# Patient Record
Sex: FEMALE | Race: WHITE | NOT HISPANIC OR LATINO
[De-identification: names, ages, dates, MRNs, and addresses within clinical notes are randomized per-mention and may not be internally consistent; named-entity substitution may affect disease eponyms.]

---

## 2017-11-01 ENCOUNTER — HOSPITAL (OUTPATIENT)
Dept: OTHER | Age: 62
End: 2017-11-01
Attending: EMERGENCY MEDICINE

## 2017-11-01 LAB
CONTROL LINE: PRESENT
Lab: CLEAR
STREP POC: NEGATIVE

## 2018-01-13 ENCOUNTER — HOSPITAL (OUTPATIENT)
Dept: OTHER | Age: 63
End: 2018-01-13
Attending: EMERGENCY MEDICINE

## 2018-08-21 ENCOUNTER — HOSPITAL (OUTPATIENT)
Dept: OTHER | Age: 63
End: 2018-08-21
Attending: FAMILY MEDICINE

## 2018-09-02 ENCOUNTER — HOSPITAL (OUTPATIENT)
Dept: OTHER | Age: 63
End: 2018-09-02
Attending: PHYSICIAN ASSISTANT

## 2018-09-04 ENCOUNTER — HOSPITAL (OUTPATIENT)
Dept: OTHER | Age: 63
End: 2018-09-04
Attending: NURSE PRACTITIONER

## 2019-02-07 ENCOUNTER — HOSPITAL (OUTPATIENT)
Dept: OTHER | Age: 64
End: 2019-02-07
Attending: FAMILY MEDICINE

## 2019-07-19 ENCOUNTER — HOSPITAL (OUTPATIENT)
Dept: OTHER | Age: 64
End: 2019-07-19
Attending: FAMILY MEDICINE

## 2019-07-20 ENCOUNTER — HOSPITAL (OUTPATIENT)
Dept: OTHER | Age: 64
End: 2019-07-20
Attending: FAMILY MEDICINE

## 2019-11-21 ENCOUNTER — HOSPITAL (OUTPATIENT)
Dept: OTHER | Age: 64
End: 2019-11-21
Attending: EMERGENCY MEDICINE

## 2024-02-29 ENCOUNTER — TELEMEDICINE (OUTPATIENT)
Dept: PRIMARY CARE | Facility: CLINIC | Age: 69
End: 2024-02-29
Payer: MEDICARE

## 2024-02-29 VITALS — HEIGHT: 65 IN | BODY MASS INDEX: 23.99 KG/M2 | WEIGHT: 144 LBS

## 2024-02-29 DIAGNOSIS — J40 BRONCHITIS: Primary | ICD-10-CM

## 2024-02-29 PROCEDURE — 1036F TOBACCO NON-USER: CPT | Performed by: PHYSICIAN ASSISTANT

## 2024-02-29 PROCEDURE — 99442 PR PHYS/QHP TELEPHONE EVALUATION 11-20 MIN: CPT | Performed by: PHYSICIAN ASSISTANT

## 2024-02-29 PROCEDURE — 1126F AMNT PAIN NOTED NONE PRSNT: CPT | Performed by: PHYSICIAN ASSISTANT

## 2024-02-29 RX ORDER — LEVOTHYROXINE SODIUM 25 UG/1
25 TABLET ORAL EVERY 24 HOURS
COMMUNITY
Start: 2023-04-04 | End: 2024-03-28

## 2024-02-29 RX ORDER — BENZONATATE 100 MG/1
100 CAPSULE ORAL 3 TIMES DAILY PRN
Qty: 30 CAPSULE | Refills: 0 | Status: SHIPPED | OUTPATIENT
Start: 2024-02-29 | End: 2024-03-30

## 2024-02-29 RX ORDER — DOXYCYCLINE 100 MG/1
100 CAPSULE ORAL 2 TIMES DAILY
Qty: 20 CAPSULE | Refills: 0 | Status: SHIPPED | OUTPATIENT
Start: 2024-02-29 | End: 2024-03-10

## 2024-02-29 ASSESSMENT — ENCOUNTER SYMPTOMS: COUGH: 1

## 2024-02-29 ASSESSMENT — PAIN SCALES - GENERAL: PAINLEVEL: 0-NO PAIN

## 2024-02-29 NOTE — PROGRESS NOTES
"Subjective   Patient ID: Marie Trammell is a 68 y.o. female who presents for Cough (Patient states she has had nasal, chest congestion [id number]. Just started using Mucinex x1 week and now she is coughing up yellow/green mucus//bp).    Presents for c/o cough, congestion onset 3 weeks but lingering - occ sweats after coughing. Cough is productive. Denies fever.     Cough         Review of Systems   Respiratory:  Positive for cough.        Objective   Ht 1.651 m (5' 5\")   Wt 65.3 kg (144 lb)   BMI 23.96 kg/m²     Physical Exam    Assessment/Plan   Diagnoses and all orders for this visit:  Bronchitis  -     doxycycline (Vibramycin) 100 mg capsule; Take 1 capsule (100 mg) by mouth 2 times a day for 10 days. Take with at least 8 ounces (large glass) of water, do not lie down for 30 minutes after  -     benzonatate (Tessalon) 100 mg capsule; Take 1 capsule (100 mg) by mouth 3 times a day as needed for cough. Do not crush or chew.         "

## 2024-03-28 DIAGNOSIS — E03.9 ACQUIRED HYPOTHYROIDISM: ICD-10-CM

## 2024-03-28 DIAGNOSIS — E83.39 ACQUIRED HYPOPHOSPHATEMIA: Primary | ICD-10-CM

## 2024-03-28 RX ORDER — LEVOTHYROXINE SODIUM 25 UG/1
25 TABLET ORAL
Qty: 90 TABLET | Refills: 1 | Status: SHIPPED | OUTPATIENT
Start: 2024-03-28

## 2024-04-23 ENCOUNTER — TELEPHONE (OUTPATIENT)
Dept: PRIMARY CARE | Facility: CLINIC | Age: 69
End: 2024-04-23

## 2024-04-23 DIAGNOSIS — Z11.59 ENCOUNTER FOR HEPATITIS C SCREENING TEST FOR LOW RISK PATIENT: ICD-10-CM

## 2024-04-23 DIAGNOSIS — D56.3 THALASSEMIA MINOR: ICD-10-CM

## 2024-04-23 DIAGNOSIS — Z00.00 ROUTINE MEDICAL EXAM: ICD-10-CM

## 2024-04-23 DIAGNOSIS — Z13.1 SCREENING FOR DIABETES MELLITUS: ICD-10-CM

## 2024-04-23 DIAGNOSIS — Z12.31 BREAST CANCER SCREENING BY MAMMOGRAM: ICD-10-CM

## 2024-04-23 DIAGNOSIS — N95.9 MENOPAUSAL DISORDER: ICD-10-CM

## 2024-04-23 DIAGNOSIS — E03.9 ACQUIRED HYPOTHYROIDISM: ICD-10-CM

## 2024-04-23 DIAGNOSIS — E55.9 VITAMIN D DEFICIENCY: Primary | ICD-10-CM

## 2024-04-23 DIAGNOSIS — Z13.220 LIPID SCREENING: ICD-10-CM

## 2024-04-23 NOTE — TELEPHONE ENCOUNTER
Patient is requesting that lab orders be put in prior to her annual wellness so the results can be discussed when she comes in on 5/24

## 2024-04-30 ENCOUNTER — HOSPITAL ENCOUNTER (OUTPATIENT)
Dept: RADIOLOGY | Facility: HOSPITAL | Age: 69
Discharge: HOME | End: 2024-04-30
Payer: MEDICARE

## 2024-04-30 VITALS — HEIGHT: 65 IN | WEIGHT: 150 LBS | BODY MASS INDEX: 24.99 KG/M2

## 2024-04-30 DIAGNOSIS — Z12.31 BREAST CANCER SCREENING BY MAMMOGRAM: ICD-10-CM

## 2024-04-30 DIAGNOSIS — N95.9 MENOPAUSAL DISORDER: ICD-10-CM

## 2024-04-30 PROCEDURE — 77067 SCR MAMMO BI INCL CAD: CPT | Performed by: RADIOLOGY

## 2024-04-30 PROCEDURE — 77063 BREAST TOMOSYNTHESIS BI: CPT | Performed by: RADIOLOGY

## 2024-04-30 PROCEDURE — 77080 DXA BONE DENSITY AXIAL: CPT

## 2024-04-30 PROCEDURE — 77080 DXA BONE DENSITY AXIAL: CPT | Performed by: RADIOLOGY

## 2024-04-30 PROCEDURE — 77067 SCR MAMMO BI INCL CAD: CPT

## 2024-05-20 ENCOUNTER — LAB (OUTPATIENT)
Dept: LAB | Facility: LAB | Age: 69
End: 2024-05-20
Payer: MEDICARE

## 2024-05-20 DIAGNOSIS — Z11.59 ENCOUNTER FOR HEPATITIS C SCREENING TEST FOR LOW RISK PATIENT: ICD-10-CM

## 2024-05-20 DIAGNOSIS — Z00.00 ROUTINE MEDICAL EXAM: ICD-10-CM

## 2024-05-20 DIAGNOSIS — Z13.1 SCREENING FOR DIABETES MELLITUS: ICD-10-CM

## 2024-05-20 DIAGNOSIS — Z13.220 LIPID SCREENING: ICD-10-CM

## 2024-05-20 DIAGNOSIS — E55.9 VITAMIN D DEFICIENCY: ICD-10-CM

## 2024-05-20 DIAGNOSIS — E03.9 ACQUIRED HYPOTHYROIDISM: ICD-10-CM

## 2024-05-20 DIAGNOSIS — Z13.0 SCREENING, IRON DEFICIENCY ANEMIA: ICD-10-CM

## 2024-05-20 DIAGNOSIS — D56.3 THALASSEMIA MINOR: ICD-10-CM

## 2024-05-20 LAB
25(OH)D3 SERPL-MCNC: 17 NG/ML (ref 30–100)
ALBUMIN SERPL BCP-MCNC: 4.1 G/DL (ref 3.4–5)
ALP SERPL-CCNC: 77 U/L (ref 33–136)
ALT SERPL W P-5'-P-CCNC: 10 U/L (ref 7–45)
ANION GAP SERPL CALC-SCNC: 9 MMOL/L (ref 10–20)
APPEARANCE UR: ABNORMAL
AST SERPL W P-5'-P-CCNC: 9 U/L (ref 9–39)
BACTERIA #/AREA URNS AUTO: ABNORMAL /HPF
BASOPHILS # BLD AUTO: 0.1 X10*3/UL (ref 0–0.1)
BASOPHILS NFR BLD AUTO: 0.9 %
BILIRUB SERPL-MCNC: 0.9 MG/DL (ref 0–1.2)
BILIRUB UR STRIP.AUTO-MCNC: NEGATIVE MG/DL
BUN SERPL-MCNC: 11 MG/DL (ref 6–23)
BURR CELLS BLD QL SMEAR: NORMAL
CALCIUM SERPL-MCNC: 9.3 MG/DL (ref 8.6–10.6)
CHLORIDE SERPL-SCNC: 106 MMOL/L (ref 98–107)
CHOLEST SERPL-MCNC: 175 MG/DL (ref 0–199)
CHOLESTEROL/HDL RATIO: 4.6
CO2 SERPL-SCNC: 26 MMOL/L (ref 21–32)
COLOR UR: YELLOW
CREAT SERPL-MCNC: 0.51 MG/DL (ref 0.5–1.05)
EGFRCR SERPLBLD CKD-EPI 2021: >90 ML/MIN/1.73M*2
EOSINOPHIL # BLD AUTO: 0.33 X10*3/UL (ref 0–0.7)
EOSINOPHIL NFR BLD AUTO: 2.9 %
ERYTHROCYTE [DISTWIDTH] IN BLOOD BY AUTOMATED COUNT: 22.4 % (ref 11.5–14.5)
GLUCOSE SERPL-MCNC: 81 MG/DL (ref 74–99)
GLUCOSE UR STRIP.AUTO-MCNC: NORMAL MG/DL
HCT VFR BLD AUTO: 35.5 % (ref 36–46)
HCV AB SER QL: NONREACTIVE
HDLC SERPL-MCNC: 38.2 MG/DL
HGB BLD-MCNC: 11.3 G/DL (ref 12–16)
IMM GRANULOCYTES # BLD AUTO: 0.1 X10*3/UL (ref 0–0.7)
IMM GRANULOCYTES NFR BLD AUTO: 0.9 % (ref 0–0.9)
KETONES UR STRIP.AUTO-MCNC: NEGATIVE MG/DL
LDLC SERPL CALC-MCNC: 107 MG/DL
LEUKOCYTE ESTERASE UR QL STRIP.AUTO: ABNORMAL
LYMPHOCYTES # BLD AUTO: 3.38 X10*3/UL (ref 1.2–4.8)
LYMPHOCYTES NFR BLD AUTO: 29.9 %
MCH RBC QN AUTO: 22.6 PG (ref 26–34)
MCHC RBC AUTO-ENTMCNC: 31.8 G/DL (ref 32–36)
MCV RBC AUTO: 71 FL (ref 80–100)
MONOCYTES # BLD AUTO: 0.86 X10*3/UL (ref 0.1–1)
MONOCYTES NFR BLD AUTO: 7.6 %
MUCOUS THREADS #/AREA URNS AUTO: ABNORMAL /LPF
NEUTROPHILS # BLD AUTO: 6.52 X10*3/UL (ref 1.2–7.7)
NEUTROPHILS NFR BLD AUTO: 57.8 %
NITRITE UR QL STRIP.AUTO: NEGATIVE
NON HDL CHOLESTEROL: 137 MG/DL (ref 0–149)
NRBC BLD-RTO: 0.3 /100 WBCS (ref 0–0)
OVALOCYTES BLD QL SMEAR: NORMAL
PH UR STRIP.AUTO: 5.5 [PH]
PLATELET # BLD AUTO: 365 X10*3/UL (ref 150–450)
POTASSIUM SERPL-SCNC: 4.3 MMOL/L (ref 3.5–5.3)
PROT SERPL-MCNC: 6.5 G/DL (ref 6.4–8.2)
PROT UR STRIP.AUTO-MCNC: ABNORMAL MG/DL
RBC # BLD AUTO: 5.01 X10*6/UL (ref 4–5.2)
RBC # UR STRIP.AUTO: NEGATIVE /UL
RBC #/AREA URNS AUTO: ABNORMAL /HPF
RBC MORPH BLD: NORMAL
SODIUM SERPL-SCNC: 137 MMOL/L (ref 136–145)
SP GR UR STRIP.AUTO: 1.02
SQUAMOUS #/AREA URNS AUTO: ABNORMAL /HPF
TRIGL SERPL-MCNC: 150 MG/DL (ref 0–149)
TSH SERPL-ACNC: 1.01 MIU/L (ref 0.44–3.98)
UROBILINOGEN UR STRIP.AUTO-MCNC: NORMAL MG/DL
VLDL: 30 MG/DL (ref 0–40)
WBC # BLD AUTO: 11.3 X10*3/UL (ref 4.4–11.3)
WBC #/AREA URNS AUTO: ABNORMAL /HPF

## 2024-05-20 PROCEDURE — 83550 IRON BINDING TEST: CPT

## 2024-05-20 PROCEDURE — 83540 ASSAY OF IRON: CPT

## 2024-05-20 PROCEDURE — 86803 HEPATITIS C AB TEST: CPT

## 2024-05-20 PROCEDURE — 80053 COMPREHEN METABOLIC PANEL: CPT

## 2024-05-20 PROCEDURE — 36415 COLL VENOUS BLD VENIPUNCTURE: CPT

## 2024-05-20 PROCEDURE — 85025 COMPLETE CBC W/AUTO DIFF WBC: CPT

## 2024-05-20 PROCEDURE — 84443 ASSAY THYROID STIM HORMONE: CPT

## 2024-05-20 PROCEDURE — 81001 URINALYSIS AUTO W/SCOPE: CPT

## 2024-05-20 PROCEDURE — 82728 ASSAY OF FERRITIN: CPT

## 2024-05-20 PROCEDURE — 82306 VITAMIN D 25 HYDROXY: CPT

## 2024-05-20 PROCEDURE — 80061 LIPID PANEL: CPT

## 2024-05-24 ENCOUNTER — OFFICE VISIT (OUTPATIENT)
Dept: PRIMARY CARE | Facility: CLINIC | Age: 69
End: 2024-05-24
Payer: MEDICARE

## 2024-05-24 VITALS
WEIGHT: 157 LBS | BODY MASS INDEX: 26.13 KG/M2 | DIASTOLIC BLOOD PRESSURE: 79 MMHG | HEART RATE: 56 BPM | SYSTOLIC BLOOD PRESSURE: 123 MMHG | OXYGEN SATURATION: 96 %

## 2024-05-24 DIAGNOSIS — J30.2 SEASONAL ALLERGIES: ICD-10-CM

## 2024-05-24 DIAGNOSIS — G47.9 SLEEP DISORDER: ICD-10-CM

## 2024-05-24 DIAGNOSIS — E53.9 VITAMIN B DEFICIENCY: ICD-10-CM

## 2024-05-24 DIAGNOSIS — Z00.00 ROUTINE GENERAL MEDICAL EXAMINATION AT HEALTH CARE FACILITY: Primary | ICD-10-CM

## 2024-05-24 DIAGNOSIS — D56.3 HETEROZYGOUS THALASSEMIA: ICD-10-CM

## 2024-05-24 DIAGNOSIS — Z13.6 SCREENING FOR CARDIOVASCULAR CONDITION: ICD-10-CM

## 2024-05-24 DIAGNOSIS — Z87.891 PERSONAL HISTORY OF TOBACCO USE, PRESENTING HAZARDS TO HEALTH: ICD-10-CM

## 2024-05-24 DIAGNOSIS — R06.2 WHEEZING: ICD-10-CM

## 2024-05-24 PROBLEM — E03.9 HYPOTHYROIDISM: Status: ACTIVE | Noted: 2024-05-24

## 2024-05-24 PROBLEM — N95.1 FEMALE CLIMACTERIC STATE: Status: ACTIVE | Noted: 2024-05-24

## 2024-05-24 PROBLEM — F41.1 GENERALIZED ANXIETY DISORDER: Status: ACTIVE | Noted: 2021-08-12

## 2024-05-24 PROBLEM — K21.9 GASTROESOPHAGEAL REFLUX DISEASE: Status: ACTIVE | Noted: 2024-05-24

## 2024-05-24 PROBLEM — N81.4 UTERINE PROLAPSE: Status: ACTIVE | Noted: 2024-05-24

## 2024-05-24 LAB
FERRITIN SERPL-MCNC: 153 NG/ML (ref 8–150)
IRON SATN MFR SERPL: 37 % (ref 25–45)
IRON SERPL-MCNC: 115 UG/DL (ref 35–150)
TIBC SERPL-MCNC: 310 UG/DL (ref 240–445)
UIBC SERPL-MCNC: 195 UG/DL (ref 110–370)

## 2024-05-24 PROCEDURE — 1159F MED LIST DOCD IN RCRD: CPT | Performed by: PHYSICIAN ASSISTANT

## 2024-05-24 PROCEDURE — G0439 PPPS, SUBSEQ VISIT: HCPCS | Performed by: PHYSICIAN ASSISTANT

## 2024-05-24 PROCEDURE — 1158F ADVNC CARE PLAN TLK DOCD: CPT | Performed by: PHYSICIAN ASSISTANT

## 2024-05-24 PROCEDURE — 99215 OFFICE O/P EST HI 40 MIN: CPT | Performed by: PHYSICIAN ASSISTANT

## 2024-05-24 PROCEDURE — 1126F AMNT PAIN NOTED NONE PRSNT: CPT | Performed by: PHYSICIAN ASSISTANT

## 2024-05-24 PROCEDURE — 1157F ADVNC CARE PLAN IN RCRD: CPT | Performed by: PHYSICIAN ASSISTANT

## 2024-05-24 PROCEDURE — 1170F FXNL STATUS ASSESSED: CPT | Performed by: PHYSICIAN ASSISTANT

## 2024-05-24 PROCEDURE — 1036F TOBACCO NON-USER: CPT | Performed by: PHYSICIAN ASSISTANT

## 2024-05-24 PROCEDURE — 99397 PER PM REEVAL EST PAT 65+ YR: CPT | Performed by: PHYSICIAN ASSISTANT

## 2024-05-24 PROCEDURE — 1123F ACP DISCUSS/DSCN MKR DOCD: CPT | Performed by: PHYSICIAN ASSISTANT

## 2024-05-24 RX ORDER — BUDESONIDE AND FORMOTEROL FUMARATE DIHYDRATE 80; 4.5 UG/1; UG/1
2 AEROSOL RESPIRATORY (INHALATION)
Qty: 10.2 G | Refills: 5 | Status: SHIPPED | OUTPATIENT
Start: 2024-05-24 | End: 2025-05-24

## 2024-05-24 RX ORDER — HYDROXYZINE HYDROCHLORIDE 25 MG/1
25 TABLET, FILM COATED ORAL NIGHTLY
Qty: 30 TABLET | Refills: 1 | Status: SHIPPED | OUTPATIENT
Start: 2024-05-24

## 2024-05-24 RX ORDER — AZELASTINE 1 MG/ML
1 SPRAY, METERED NASAL 2 TIMES DAILY
Qty: 30 ML | Refills: 12 | Status: SHIPPED | OUTPATIENT
Start: 2024-05-24 | End: 2025-05-24

## 2024-05-24 ASSESSMENT — ENCOUNTER SYMPTOMS
WHEEZING: 0
SHORTNESS OF BREATH: 0
COUGH: 1
DEPRESSION: 0
FATIGUE: 1
LOSS OF SENSATION IN FEET: 0
OCCASIONAL FEELINGS OF UNSTEADINESS: 0
FEVER: 0
ABDOMINAL PAIN: 0

## 2024-05-24 ASSESSMENT — GERIATRIC MINI NUTRITIONAL ASSESSMENT (MNA)
B WEIGHT LOSS DURING THE LAST 3 MONTHS: DOES NOT KNOW
E NEUROPSYCHOLOGICAL PROBLEMS: NO PSYCHOLOGICAL PROBLEMS
A HAS FOOD INTAKE DECLINED OVER THE PAST 3 MONTHS DUE TO LOSS OF APPETITE, DIGESTIVE PROBLEMS, CHEWING OR SWALLOWING DIFFICULTIES?: NO DECREASE IN FOOD INTAKE
C GENERAL MOBILITY: GOES OUT

## 2024-05-24 ASSESSMENT — ACTIVITIES OF DAILY LIVING (ADL)
MANAGING FINANCES: INDEPENDENT
JUDGMENT_ADEQUATE_SAFELY_COMPLETE_DAILY_ACTIVITIES: YES
TOILETING: INDEPENDENT
USING TRANSPORTATION: INDEPENDENT
USING TELEPHONE: INDEPENDENT
NEEDS ASSISTANCE WITH FOOD: INDEPENDENT
EATING: INDEPENDENT
PREPARING MEALS: INDEPENDENT
GROCERY SHOPPING: INDEPENDENT
TAKING MEDICATION: INDEPENDENT
DRESSING: INDEPENDENT
FEEDING: INDEPENDENT
ADEQUATE_TO_COMPLETE_ADL: YES
DOING HOUSEWORK: INDEPENDENT
STIL DRIVING: YES
PILL BOX USED: NO

## 2024-05-24 ASSESSMENT — PATIENT HEALTH QUESTIONNAIRE - PHQ9
SUM OF ALL RESPONSES TO PHQ9 QUESTIONS 1 AND 2: 0
2. FEELING DOWN, DEPRESSED OR HOPELESS: NOT AT ALL
1. LITTLE INTEREST OR PLEASURE IN DOING THINGS: NOT AT ALL

## 2024-05-24 ASSESSMENT — PAIN SCALES - GENERAL: PAINLEVEL: 0-NO PAIN

## 2024-05-24 ASSESSMENT — COGNITIVE AND FUNCTIONAL STATUS - GENERAL: VERBAL FLUENCY - ANIMAL NAMES (0 TO 25): 3

## 2024-05-24 NOTE — PROGRESS NOTES
Subjective   Patient ID: Marie Trammell is a 68 y.o. female who presents for Annual Exam.    HPI   Active Medical Problems: vitamin D deficiency, osteopenia, hypothyroidism  Other Providers: none  PMH/PSH/FMH/SHX: Reviewed  Advanced Directives: Discussed  Hearing changes: Denies  Depression: Denies  Functional Ability/Safety: Get up and go test <30s. Denies issues with ADLs. No home safety concerns. Pt denies falls  Cognitive Eval: See MMSE  Preventative Services: DEXA, mammogram UTD    Labs w/unspecified anemia (thalassemia), low vitamin D, elevated cholesterol.     C/o postnasal drip, persistent cough. Feeling more fatigued. Denies wheezing, dyspnea. Quit smoking last year, previously 1/2PPD x >40 years.    Reports difficulty sleeping. Avg 3-5 hrs per night. Under excess stress managing mom's care - she is moving to an assisted living facility next week. Has tried melatonin, tylenol PM w/no success.    Review of Systems   Constitutional:  Positive for fatigue. Negative for fever.   Respiratory:  Positive for cough. Negative for shortness of breath and wheezing.    Cardiovascular:  Negative for chest pain.   Gastrointestinal:  Negative for abdominal pain.   Skin:  Negative for rash.       Objective   /79   Pulse 56   Wt 71.2 kg (157 lb)   SpO2 96%   BMI 26.13 kg/m²     Physical Exam  Vitals reviewed.   Constitutional:       Appearance: Normal appearance.   HENT:      Head: Normocephalic and atraumatic.      Right Ear: Tympanic membrane and ear canal normal.      Left Ear: Tympanic membrane and ear canal normal.      Nose: Nose normal.      Mouth/Throat:      Mouth: Mucous membranes are moist.      Pharynx: No oropharyngeal exudate.   Eyes:      Extraocular Movements: Extraocular movements intact.      Conjunctiva/sclera: Conjunctivae normal.      Pupils: Pupils are equal, round, and reactive to light.   Cardiovascular:      Rate and Rhythm: Normal rate and regular rhythm.      Heart sounds: Normal heart  sounds.   Pulmonary:      Effort: Pulmonary effort is normal.      Breath sounds: Normal breath sounds. No wheezing.   Abdominal:      General: There is no distension.      Palpations: Abdomen is soft.      Tenderness: There is no abdominal tenderness.   Musculoskeletal:         General: No tenderness.      Cervical back: Normal range of motion and neck supple.   Skin:     General: Skin is warm and dry.      Findings: No rash.   Neurological:      General: No focal deficit present.      Mental Status: She is alert. Mental status is at baseline.   Psychiatric:         Mood and Affect: Mood normal.         Assessment/Plan   Problem List Items Addressed This Visit             ICD-10-CM    Heterozygous thalassemia D56.3    Relevant Orders    CBC and Auto Differential     Other Visit Diagnoses         Codes    Routine general medical examination at health care facility    -  Primary Z00.00    Vitamin B deficiency     E53.9    Relevant Orders    Vitamin B12    Screening for cardiovascular condition     Z13.6    Relevant Orders    CT cardiac scoring wo IV contrast    Personal history of tobacco use, presenting hazards to health     Z87.891    Relevant Orders    CT lung screening low dose    Complete Pulmonary Function Test Pre/Post Bronchodialator (Spirometry Pre/Post/DLCO/Lung Volumes)    Sleep disorder     G47.9    Relevant Medications    hydrOXYzine HCL (Atarax) 25 mg tablet    Seasonal allergies     J30.2    Relevant Medications    azelastine (Astelin) 137 mcg (0.1 %) nasal spray    Wheezing     R06.2    Relevant Medications    budesonide-formoteroL (Symbicort) 80-4.5 mcg/actuation inhaler

## 2024-05-30 ENCOUNTER — TELEPHONE (OUTPATIENT)
Dept: PRIMARY CARE | Facility: CLINIC | Age: 69
End: 2024-05-30
Payer: MEDICARE

## 2024-05-30 NOTE — TELEPHONE ENCOUNTER
Pharmacy requested a prior auth for Hydroxyzine HCL 25mg tablet. However, prior auth has been denied due to Medicare part D not approving the diagnosis provided for the use of the requested medication.

## 2024-08-14 ENCOUNTER — OFFICE VISIT (OUTPATIENT)
Dept: OPHTHALMOLOGY | Facility: CLINIC | Age: 69
End: 2024-08-14
Payer: MEDICARE

## 2024-08-14 DIAGNOSIS — H00.11 CHALAZION OF RIGHT UPPER EYELID: Primary | ICD-10-CM

## 2024-08-14 PROBLEM — H52.7 REFRACTION ERROR: Status: ACTIVE | Noted: 2024-08-14

## 2024-08-14 PROBLEM — H25.13 AGE-RELATED NUCLEAR CATARACT OF BOTH EYES: Status: ACTIVE | Noted: 2024-08-14

## 2024-08-14 PROCEDURE — 99203 OFFICE O/P NEW LOW 30 MIN: CPT | Performed by: OPHTHALMOLOGY

## 2024-08-14 PROCEDURE — 99213 OFFICE O/P EST LOW 20 MIN: CPT | Performed by: OPHTHALMOLOGY

## 2024-08-14 RX ORDER — SULFAMETHOXAZOLE AND TRIMETHOPRIM 800; 160 MG/1; MG/1
1 TABLET ORAL
COMMUNITY
Start: 2024-08-12 | End: 2024-08-14 | Stop reason: WASHOUT

## 2024-08-14 RX ORDER — NEOMYCIN SULFATE, POLYMYXIN B SULFATE AND DEXAMETHASONE 3.5; 10000; 1 MG/ML; [USP'U]/ML; MG/ML
SUSPENSION/ DROPS OPHTHALMIC
COMMUNITY
Start: 2024-08-12 | End: 2024-08-14

## 2024-08-14 RX ORDER — NEOMYCIN SULFATE, POLYMYXIN B SULFATE AND DEXAMETHASONE 3.5; 10000; 1 MG/ML; [USP'U]/ML; MG/ML
SUSPENSION/ DROPS OPHTHALMIC
Start: 2024-08-14

## 2024-08-14 ASSESSMENT — EXTERNAL EXAM - RIGHT EYE: OD_EXAM: NORMAL

## 2024-08-14 ASSESSMENT — VISUAL ACUITY
OD_CC+: +2
OS_CC+: -1
OS_CC: 20/20
METHOD: SNELLEN - LINEAR
CORRECTION_TYPE: GLASSES
OD_PH_CC+: +2
OD_PH_CC: 20/25
OD_CC: 20/40

## 2024-08-14 ASSESSMENT — REFRACTION_WEARINGRX
OS_CYLINDER: -0.75
OS_AXIS: 108
OD_CYLINDER: -0.25
OS_SPHERE: +1.50
OD_AXIS: 079
OD_SPHERE: +2.00
SPECS_TYPE: PAL
OD_ADD: +2.75
OS_ADD: +2.75

## 2024-08-14 ASSESSMENT — ENCOUNTER SYMPTOMS
GASTROINTESTINAL NEGATIVE: 0
CONSTITUTIONAL NEGATIVE: 0
NEUROLOGICAL NEGATIVE: 0
ENDOCRINE NEGATIVE: 0
ALLERGIC/IMMUNOLOGIC NEGATIVE: 0
PSYCHIATRIC NEGATIVE: 0
HEMATOLOGIC/LYMPHATIC NEGATIVE: 0
MUSCULOSKELETAL NEGATIVE: 0
EYES NEGATIVE: 0
RESPIRATORY NEGATIVE: 0
CARDIOVASCULAR NEGATIVE: 0

## 2024-08-14 ASSESSMENT — PATIENT HEALTH QUESTIONNAIRE - PHQ9
SUM OF ALL RESPONSES TO PHQ9 QUESTIONS 1 AND 2: 0
1. LITTLE INTEREST OR PLEASURE IN DOING THINGS: NOT AT ALL
2. FEELING DOWN, DEPRESSED OR HOPELESS: NOT AT ALL

## 2024-08-14 ASSESSMENT — TONOMETRY
IOP_METHOD: GOLDMANN APPLANATION
OS_IOP_MMHG: 15
OD_IOP_MMHG: 15

## 2024-08-14 ASSESSMENT — SLIT LAMP EXAM - LIDS
COMMENTS: NORMAL
COMMENTS: CHALAZION: UPPER LID

## 2024-08-14 ASSESSMENT — PAIN SCALES - GENERAL: PAINLEVEL: 0-NO PAIN

## 2024-08-14 ASSESSMENT — EXTERNAL EXAM - LEFT EYE: OS_EXAM: NORMAL

## 2024-08-14 NOTE — PROGRESS NOTES
Assessment/Plan   Problem List Items Addressed This Visit       Chalazion of right upper eyelid - Primary     Appears to have responded well to maxitrol. Advised will have begin taper process but to remain on until seen back. Discussed good heat and lubrication. See back in few weeks or sooner PRN.          Relevant Medications    neomycin-polymyxin-dexAMETHasone (Maxitrol) 3.5mg/mL-10,000 unit/mL-0.1 % ophthalmic suspension       Provided reassurance regarding above diagnoses and care received in the office visit today. Discussed outcomes and options along with the importance of treatment compliance. Understands the importance of any follow up visits. Patient instructed to call/communicate with our office if any new issues, questions, or concerns.     Will plan to see back in 2 weeks surface check or sooner PRN

## 2024-08-14 NOTE — PATIENT INSTRUCTIONS
Thank you so much for choosing me to provide your care today!    If you were dilated your vision may remain blurry   or light sensitive for several hours.    The nature of eye and vision problems can require frequent follow up, please make every effort to adhere to any future appointments.    If you have any issues, questions, or concerns,   please do not hesitate to reach out.    If you receive a survey in regards to your care today, please mention any exceptional care my office staff and/or technicians provided.    You can reach our office at this number:  [phone]

## 2024-08-14 NOTE — ASSESSMENT & PLAN NOTE
Appears to have responded well to maxitrol. Advised will have begin taper process but to remain on until seen back. Discussed good heat and lubrication. See back in few weeks or sooner PRN.

## 2024-08-29 ENCOUNTER — APPOINTMENT (OUTPATIENT)
Dept: OPHTHALMOLOGY | Facility: CLINIC | Age: 69
End: 2024-08-29
Payer: MEDICARE

## 2024-08-29 DIAGNOSIS — H00.11 CHALAZION OF RIGHT UPPER EYELID: Primary | ICD-10-CM

## 2024-08-29 PROCEDURE — 99212 OFFICE O/P EST SF 10 MIN: CPT | Performed by: OPHTHALMOLOGY

## 2024-08-29 ASSESSMENT — VISUAL ACUITY
OD_CC+: +2
OS_CC: 20/30
OD_PH_CC: 20/30
OD_PH_CC+: -2
OS_CC+: -1
CORRECTION_TYPE: GLASSES
OD_CC: 20/50
METHOD: SNELLEN - LINEAR

## 2024-08-29 ASSESSMENT — ENCOUNTER SYMPTOMS
ENDOCRINE NEGATIVE: 0
RESPIRATORY NEGATIVE: 0
GASTROINTESTINAL NEGATIVE: 0
CONSTITUTIONAL NEGATIVE: 0
ALLERGIC/IMMUNOLOGIC NEGATIVE: 0
HEMATOLOGIC/LYMPHATIC NEGATIVE: 0
EYES NEGATIVE: 0
PSYCHIATRIC NEGATIVE: 0
CARDIOVASCULAR NEGATIVE: 0
MUSCULOSKELETAL NEGATIVE: 0
NEUROLOGICAL NEGATIVE: 0

## 2024-08-29 ASSESSMENT — REFRACTION_WEARINGRX
OD_ADD: +2.75
OD_CYLINDER: -0.25
SPECS_TYPE: PAL
OS_ADD: +2.75
OS_SPHERE: +1.50
OD_AXIS: 079
OS_AXIS: 108
OD_SPHERE: +2.00
OS_CYLINDER: -0.75

## 2024-08-29 ASSESSMENT — EXTERNAL EXAM - RIGHT EYE: OD_EXAM: NORMAL

## 2024-08-29 ASSESSMENT — EXTERNAL EXAM - LEFT EYE: OS_EXAM: NORMAL

## 2024-08-29 ASSESSMENT — PATIENT HEALTH QUESTIONNAIRE - PHQ9
2. FEELING DOWN, DEPRESSED OR HOPELESS: NOT AT ALL
1. LITTLE INTEREST OR PLEASURE IN DOING THINGS: NOT AT ALL
SUM OF ALL RESPONSES TO PHQ9 QUESTIONS 1 AND 2: 0

## 2024-08-29 ASSESSMENT — PAIN SCALES - GENERAL: PAINLEVEL: 0-NO PAIN

## 2024-08-29 ASSESSMENT — SLIT LAMP EXAM - LIDS: COMMENTS: NORMAL

## 2024-08-29 NOTE — PATIENT INSTRUCTIONS
Thank you so much for choosing me to provide your care today!    If you were dilated your vision may remain blurry   or light sensitive for several hours.    The nature of eye and vision problems can require frequent follow up, please make every effort to adhere to any future appointments.    If you have any issues, questions, or concerns,   please do not hesitate to reach out.    If you receive a survey in regards to your care today, please mention any exceptional care my office staff and/or technicians provided.    You can reach our office at this number:    [phone]    Please consider signing up for and utilizing [employer]!  This is the best way to directly reach me or other  providers

## 2024-08-29 NOTE — PROGRESS NOTES
Assessment/Plan   Problem List Items Addressed This Visit       Chalazion of right upper eyelid - Primary     Great response to maxitrol. Trace residual chalazion, suspect would still get benefit from regular heat. Advised OK to resume some lubrication. Will call if any worsening.             Provided reassurance regarding above diagnoses and care received in the office visit today. Discussed outcomes and options along with the importance of treatment compliance. Understands the importance of any follow up visits. Patient instructed to call/communicate with our office if any new issues, questions, or concerns.     Will plan to see back in 1-2 months full or sooner PRN

## 2024-08-29 NOTE — ASSESSMENT & PLAN NOTE
Great response to maxitrol. Trace residual chalazion, suspect would still get benefit from regular heat. Advised OK to resume some lubrication. Will call if any worsening.

## 2024-09-17 DIAGNOSIS — E03.9 ACQUIRED HYPOTHYROIDISM: ICD-10-CM

## 2024-09-18 RX ORDER — LEVOTHYROXINE SODIUM 25 UG/1
25 TABLET ORAL
Qty: 90 TABLET | Refills: 1 | Status: SHIPPED | OUTPATIENT
Start: 2024-09-18

## 2024-10-23 ENCOUNTER — APPOINTMENT (OUTPATIENT)
Dept: OPHTHALMOLOGY | Facility: CLINIC | Age: 69
End: 2024-10-23
Payer: MEDICARE

## 2024-10-23 DIAGNOSIS — H00.11 CHALAZION OF RIGHT UPPER EYELID: ICD-10-CM

## 2024-10-23 DIAGNOSIS — H16.223 KERATOCONJUNCTIVITIS SICCA OF BOTH EYES NOT DUE TO SJOGREN'S SYNDROME: ICD-10-CM

## 2024-10-23 DIAGNOSIS — H25.13 AGE-RELATED NUCLEAR CATARACT OF BOTH EYES: Primary | ICD-10-CM

## 2024-10-23 DIAGNOSIS — H52.7 REFRACTION ERROR: ICD-10-CM

## 2024-10-23 PROCEDURE — 92015 DETERMINE REFRACTIVE STATE: CPT | Mod: MUE | Performed by: OPHTHALMOLOGY

## 2024-10-23 PROCEDURE — 99214 OFFICE O/P EST MOD 30 MIN: CPT | Performed by: OPHTHALMOLOGY

## 2024-10-23 PROCEDURE — 92015 DETERMINE REFRACTIVE STATE: CPT | Performed by: OPHTHALMOLOGY

## 2024-10-23 ASSESSMENT — EXTERNAL EXAM - RIGHT EYE: OD_EXAM: NORMAL

## 2024-10-23 ASSESSMENT — REFRACTION_WEARINGRX
SPECS_TYPE: PAL
OS_ADD: +2.75
OD_SPHERE: +2.00
OD_AXIS: 082
OD_CYLINDER: -0.25
OS_CYLINDER: -0.75
OS_SPHERE: +1.75
OS_AXIS: 106
OD_ADD: +2.75

## 2024-10-23 ASSESSMENT — REFRACTION_MANIFEST
OD_SPHERE: +2.50
OS_CYLINDER: -0.75
METHOD_AUTOREFRACTION: 1
OS_SPHERE: +2.25
OD_AXIS: 080
OD_CYLINDER: -0.50
OS_AXIS: 110

## 2024-10-23 ASSESSMENT — VISUAL ACUITY
OD_CC: 20/40
OD_PH_CC: 20/30
OD_PH_CC+: -2
OS_CC: 20/30
CORRECTION_TYPE: GLASSES
OS_CC+: -2
OD_CC+: -2
METHOD: SNELLEN - SINGLE

## 2024-10-23 ASSESSMENT — PAIN SCALES - GENERAL: PAINLEVEL_OUTOF10: 0-NO PAIN

## 2024-10-23 ASSESSMENT — CUP TO DISC RATIO
OS_RATIO: 0.3
OD_RATIO: 0.3

## 2024-10-23 ASSESSMENT — EXTERNAL EXAM - LEFT EYE: OS_EXAM: NORMAL

## 2024-10-23 ASSESSMENT — ENCOUNTER SYMPTOMS
ALLERGIC/IMMUNOLOGIC NEGATIVE: 0
HEMATOLOGIC/LYMPHATIC NEGATIVE: 0
NEUROLOGICAL NEGATIVE: 0
EYES NEGATIVE: 0
RESPIRATORY NEGATIVE: 0
CARDIOVASCULAR NEGATIVE: 0
CONSTITUTIONAL NEGATIVE: 0
PSYCHIATRIC NEGATIVE: 0
MUSCULOSKELETAL NEGATIVE: 0
ENDOCRINE NEGATIVE: 0
GASTROINTESTINAL NEGATIVE: 0

## 2024-10-23 ASSESSMENT — KERATOMETRY
METHOD_AUTO_MANUAL: AUTOMATED
OS_K2POWER_DIOPTERS: 43.00
OD_AXISANGLE2_DEGREES: 10
OS_AXISANGLE_DEGREES: 80
OD_AXISANGLE_DEGREES: 100
OD_K2POWER_DIOPTERS: 43.75
OS_K1POWER_DIOPTERS: 42.25
OS_AXISANGLE2_DEGREES: 170
OD_K1POWER_DIOPTERS: 43.00

## 2024-10-23 ASSESSMENT — SLIT LAMP EXAM - LIDS
COMMENTS: NORMAL
COMMENTS: NORMAL

## 2024-10-23 ASSESSMENT — PATIENT HEALTH QUESTIONNAIRE - PHQ9
1. LITTLE INTEREST OR PLEASURE IN DOING THINGS: NOT AT ALL
SUM OF ALL RESPONSES TO PHQ9 QUESTIONS 1 AND 2: 0
2. FEELING DOWN, DEPRESSED OR HOPELESS: NOT AT ALL

## 2024-10-23 ASSESSMENT — TONOMETRY
OD_IOP_MMHG: 17
IOP_METHOD: GOLDMANN APPLANATION
OS_IOP_MMHG: 16

## 2024-10-23 NOTE — PATIENT INSTRUCTIONS
Thank you so much for choosing me to provide your care today!    If you were dilated your vision may remain blurry   or light sensitive for several hours.    The nature of eye and vision problems can require frequent follow up, please make every effort to adhere to any future appointments.    If you have any issues, questions, or concerns,   please do not hesitate to reach out.    If you receive a survey in regards to your care today, please mention any exceptional care my office staff and/or technicians provided.    You can reach our office at this number:    [phone]    Please consider signing up for and utilizing [employer]!  This is the best way to directly reach me or other  providers     Artificial Tears/lubricating drops  Recommendations for daily use      Refresh  [x]Refresh Tears []Refresh Advance  []Refresh Optive  []Refresh LiquiGel  []Refresh preservative free in single use vial    Systane  [x]Systane ultra []Systane Balance  []Systane Gel   []Systane preservative free in single use vial    Genteal  []Genteal  []Genteal gel    Blink  []Blink   []Blink for contacts      You may use these drops:  [x]1 drop 2-4 times daily  []1 drop 4 times daily  []1 drop every 1-2 hours or as needed  []At bedtime    It is OK to substitute generic store brand varieties of drops. Artificial tears work best when used consistently instead of as needed.    *Avoid any drops for allergy/itching/redness unless directed otherwise*

## 2024-10-23 NOTE — PROGRESS NOTES
Assessment/Plan   Problem List Items Addressed This Visit       Chalazion of right upper eyelid     Responded well to drops and lid hygiene. Should call if any recurrence.          Age-related nuclear cataract of both eyes - Primary     Non significant cataract noted on exam. Will plan to continue to monitor with serial exam.            Refraction error     New Rx for glasses/SCL given per patient request. Patient's signature obtained to acknowledge and confirm that a paper copy of glasses/SCL Rx was given to patient in compliance with [facility] Eyeglass Rule. Electronic copy of Rx will also be available via [employer].            Keratoconjunctivitis sicca of both eyes not due to Sjogren's syndrome     Discussed good lubrication for ocular surface comfort and vision.             Provided reassurance regarding above diagnoses and care received in the office visit today. Discussed outcomes and options along with the importance of treatment compliance. Understands the importance of any follow up visits. Patient instructed to call/communicate with our office if any new issues, questions, or concerns.     Will plan to see back in 1 year full or sooner PRN

## 2024-10-23 NOTE — ASSESSMENT & PLAN NOTE
New Rx for glasses/SCL given per patient request. Patient's signature obtained to acknowledge and confirm that a paper copy of glasses/SCL Rx was given to patient in compliance with [facility] Eyeglass Rule. Electronic copy of Rx will also be available via [employer].

## 2024-11-18 ENCOUNTER — LAB (OUTPATIENT)
Dept: LAB | Facility: LAB | Age: 69
End: 2024-11-18
Payer: MEDICARE

## 2024-11-18 DIAGNOSIS — D56.3 HETEROZYGOUS THALASSEMIA: ICD-10-CM

## 2024-11-18 DIAGNOSIS — E53.9 VITAMIN B DEFICIENCY: ICD-10-CM

## 2024-11-18 LAB
BASOPHILS # BLD AUTO: 0.11 X10*3/UL (ref 0–0.1)
BASOPHILS NFR BLD AUTO: 1 %
DACRYOCYTES BLD QL SMEAR: NORMAL
EOSINOPHIL # BLD AUTO: 0.26 X10*3/UL (ref 0–0.7)
EOSINOPHIL NFR BLD AUTO: 2.5 %
ERYTHROCYTE [DISTWIDTH] IN BLOOD BY AUTOMATED COUNT: 22.5 % (ref 11.5–14.5)
HCT VFR BLD AUTO: 38 % (ref 36–46)
HGB BLD-MCNC: 12.3 G/DL (ref 12–16)
HYPOCHROMIA BLD QL SMEAR: NORMAL
IMM GRANULOCYTES # BLD AUTO: 0.06 X10*3/UL (ref 0–0.7)
IMM GRANULOCYTES NFR BLD AUTO: 0.6 % (ref 0–0.9)
LYMPHOCYTES # BLD AUTO: 2.92 X10*3/UL (ref 1.2–4.8)
LYMPHOCYTES NFR BLD AUTO: 27.8 %
MCH RBC QN AUTO: 22.4 PG (ref 26–34)
MCHC RBC AUTO-ENTMCNC: 32.4 G/DL (ref 32–36)
MCV RBC AUTO: 69 FL (ref 80–100)
MONOCYTES # BLD AUTO: 0.81 X10*3/UL (ref 0.1–1)
MONOCYTES NFR BLD AUTO: 7.7 %
NEUTROPHILS # BLD AUTO: 6.36 X10*3/UL (ref 1.2–7.7)
NEUTROPHILS NFR BLD AUTO: 60.4 %
NRBC BLD-RTO: 0.2 /100 WBCS (ref 0–0)
OVALOCYTES BLD QL SMEAR: NORMAL
PLATELET # BLD AUTO: 357 X10*3/UL (ref 150–450)
POLYCHROMASIA BLD QL SMEAR: NORMAL
RBC # BLD AUTO: 5.5 X10*6/UL (ref 4–5.2)
RBC MORPH BLD: NORMAL
VIT B12 SERPL-MCNC: 1656 PG/ML (ref 211–911)
WBC # BLD AUTO: 10.5 X10*3/UL (ref 4.4–11.3)

## 2024-11-18 PROCEDURE — 82607 VITAMIN B-12: CPT

## 2024-11-18 PROCEDURE — 36415 COLL VENOUS BLD VENIPUNCTURE: CPT

## 2024-11-18 PROCEDURE — 85025 COMPLETE CBC W/AUTO DIFF WBC: CPT

## 2024-11-25 ENCOUNTER — OFFICE VISIT (OUTPATIENT)
Dept: PRIMARY CARE | Facility: CLINIC | Age: 69
End: 2024-11-25
Payer: MEDICARE

## 2024-11-25 VITALS
BODY MASS INDEX: 26.19 KG/M2 | OXYGEN SATURATION: 98 % | HEART RATE: 79 BPM | DIASTOLIC BLOOD PRESSURE: 86 MMHG | WEIGHT: 157.4 LBS | SYSTOLIC BLOOD PRESSURE: 150 MMHG

## 2024-11-25 DIAGNOSIS — F41.1 GAD (GENERALIZED ANXIETY DISORDER): ICD-10-CM

## 2024-11-25 DIAGNOSIS — D56.3 THALASSEMIA MINOR: Primary | ICD-10-CM

## 2024-11-25 DIAGNOSIS — J01.90 ACUTE RHINOSINUSITIS: ICD-10-CM

## 2024-11-25 PROCEDURE — 1160F RVW MEDS BY RX/DR IN RCRD: CPT | Performed by: PHYSICIAN ASSISTANT

## 2024-11-25 PROCEDURE — 1157F ADVNC CARE PLAN IN RCRD: CPT | Performed by: PHYSICIAN ASSISTANT

## 2024-11-25 PROCEDURE — 1158F ADVNC CARE PLAN TLK DOCD: CPT | Performed by: PHYSICIAN ASSISTANT

## 2024-11-25 PROCEDURE — 99214 OFFICE O/P EST MOD 30 MIN: CPT | Performed by: PHYSICIAN ASSISTANT

## 2024-11-25 PROCEDURE — 1159F MED LIST DOCD IN RCRD: CPT | Performed by: PHYSICIAN ASSISTANT

## 2024-11-25 PROCEDURE — 1125F AMNT PAIN NOTED PAIN PRSNT: CPT | Performed by: PHYSICIAN ASSISTANT

## 2024-11-25 PROCEDURE — 1123F ACP DISCUSS/DSCN MKR DOCD: CPT | Performed by: PHYSICIAN ASSISTANT

## 2024-11-25 PROCEDURE — 1036F TOBACCO NON-USER: CPT | Performed by: PHYSICIAN ASSISTANT

## 2024-11-25 RX ORDER — DOXYCYCLINE 100 MG/1
100 CAPSULE ORAL 2 TIMES DAILY
Qty: 14 CAPSULE | Refills: 0 | Status: SHIPPED | OUTPATIENT
Start: 2024-11-25 | End: 2024-12-02

## 2024-11-25 RX ORDER — CYANOCOBALAMIN (VITAMIN B-12) 250 MCG
250 TABLET ORAL DAILY
COMMUNITY

## 2024-11-25 RX ORDER — FLUOXETINE 10 MG/1
10 CAPSULE ORAL DAILY
Qty: 30 CAPSULE | Refills: 1 | Status: SHIPPED | OUTPATIENT
Start: 2024-11-25 | End: 2025-01-24

## 2024-11-25 ASSESSMENT — ENCOUNTER SYMPTOMS
SLEEP DISTURBANCE: 1
SINUS PRESSURE: 1
WHEEZING: 1
SHORTNESS OF BREATH: 0
DYSPHORIC MOOD: 0
SINUS PAIN: 1
NERVOUS/ANXIOUS: 1
FATIGUE: 1
COUGH: 1

## 2024-11-25 ASSESSMENT — COLUMBIA-SUICIDE SEVERITY RATING SCALE - C-SSRS
6. HAVE YOU EVER DONE ANYTHING, STARTED TO DO ANYTHING, OR PREPARED TO DO ANYTHING TO END YOUR LIFE?: NO
1. IN THE PAST MONTH, HAVE YOU WISHED YOU WERE DEAD OR WISHED YOU COULD GO TO SLEEP AND NOT WAKE UP?: NO
2. HAVE YOU ACTUALLY HAD ANY THOUGHTS OF KILLING YOURSELF?: NO

## 2024-11-25 ASSESSMENT — PAIN SCALES - GENERAL: PAINLEVEL_OUTOF10: 4

## 2024-11-25 NOTE — PATIENT INSTRUCTIONS
BP - purchase home BP cuff (arm cuff is more accurate than wrist cuff) and check blood pressure once a day for 1 week. Please call the office or send me a message with these readings. Please bring your blood pressure cuff to your follow up visit.

## 2024-11-25 NOTE — PROGRESS NOTES
Subjective   Patient ID: Marie Trammell is a 69 y.o. female who presents for Results (Pt is here for blood work results / nr) and Cough (Pt would like to discuss a sinus infection, coughing, sinus pain, post nasal drip, since Wednesday / nr).    HPI   Pt managed for vitamin D deficiency, osteopenia, hypothyroidism, thalassemia minor    Hx of thalassemia minor. Ferritin elevated, vitamin B12 elevated. Morphology w/ovalocytes, anita cells, teardrop cells. Last saw hematology age 13. Currently on 500mcg vitamin B12.     Last visit c/o postnasal drip, persistent cough. Feeling more fatigued. Denies wheezing, dyspnea. Quit smoking last year, previously 1/2PPD x >40 years. Had PFT, CT lung screen, and CT Ca score ordered but has not yet completed. Since she quit smoking, she denies any shortness of breath, cough. Does not wish to do this testing at this time.    Today, states 4-5 days ago developed maxillary sinus pressure. Has a burning sensation, yellowish-green nasal congestion, postnasal drip. Starting to develop a cough.     Still feeling excessively fatigued. Under a lot of stress with her mom in an assisted living facility. Has constant intrusive thoughts, wakes up frequently throughout the night. Denies difficulty falling asleep. STOP-BANG score 2.    Vitamin D - taking 2000IU vitamin D3      Review of Systems   Constitutional:  Positive for fatigue.   HENT:  Positive for postnasal drip, sinus pressure and sinus pain.    Respiratory:  Positive for cough and wheezing. Negative for shortness of breath.    Psychiatric/Behavioral:  Positive for sleep disturbance. Negative for dysphoric mood and suicidal ideas. The patient is nervous/anxious.        Objective   /86   Pulse 79   Wt 71.4 kg (157 lb 6.4 oz)   SpO2 98%   BMI 26.19 kg/m²     Physical Exam  Constitutional:       Appearance: Normal appearance.   HENT:      Head: Normocephalic and atraumatic.   Eyes:      Extraocular Movements: Extraocular movements  intact.      Conjunctiva/sclera: Conjunctivae normal.   Cardiovascular:      Rate and Rhythm: Normal rate and regular rhythm.      Heart sounds: Normal heart sounds.   Pulmonary:      Effort: Pulmonary effort is normal.      Breath sounds: Normal breath sounds. No wheezing or rhonchi.   Musculoskeletal:      Cervical back: Normal range of motion and neck supple.   Skin:     General: Skin is warm.      Findings: No rash.   Neurological:      General: No focal deficit present.      Mental Status: She is alert.         Assessment/Plan   Problem List Items Addressed This Visit    None  Visit Diagnoses         Codes    Thalassemia minor    -  Primary D56.3    Relevant Orders    Referral to Hematology and Oncology    Acute rhinosinusitis     J01.90    Relevant Medications    doxycycline (Vibramycin) 100 mg capsule    MARY (generalized anxiety disorder)     F41.1    Relevant Medications    FLUoxetine (PROzac) 10 mg capsule

## 2024-11-26 ENCOUNTER — PATIENT MESSAGE (OUTPATIENT)
Dept: PRIMARY CARE | Facility: CLINIC | Age: 69
End: 2024-11-26
Payer: MEDICARE

## 2024-12-02 ENCOUNTER — TELEPHONE (OUTPATIENT)
Dept: PRIMARY CARE | Facility: CLINIC | Age: 69
End: 2024-12-02
Payer: MEDICARE

## 2024-12-03 DIAGNOSIS — E53.9 VITAMIN B DEFICIENCY: ICD-10-CM

## 2024-12-03 DIAGNOSIS — R79.89 ELEVATED VITAMIN B12 LEVEL: Primary | ICD-10-CM

## 2024-12-20 ENCOUNTER — LAB (OUTPATIENT)
Dept: LAB | Facility: LAB | Age: 69
End: 2024-12-20
Payer: MEDICARE

## 2024-12-20 DIAGNOSIS — R03.0 ELEVATED BLOOD PRESSURE READING: ICD-10-CM

## 2024-12-20 DIAGNOSIS — R79.89 ELEVATED VITAMIN B12 LEVEL: ICD-10-CM

## 2024-12-20 LAB
ANION GAP SERPL CALCULATED.3IONS-SCNC: 9 MMOL/L (ref 10–20)
BUN SERPL-MCNC: 13 MG/DL (ref 6–23)
CALCIUM SERPL-MCNC: 9.3 MG/DL (ref 8.6–10.3)
CHLORIDE SERPL-SCNC: 105 MMOL/L (ref 98–107)
CO2 SERPL-SCNC: 28 MMOL/L (ref 21–32)
CREAT SERPL-MCNC: 0.59 MG/DL (ref 0.5–1.05)
EGFRCR SERPLBLD CKD-EPI 2021: >90 ML/MIN/1.73M*2
GLUCOSE SERPL-MCNC: 88 MG/DL (ref 74–99)
POTASSIUM SERPL-SCNC: 4.4 MMOL/L (ref 3.5–5.3)
SODIUM SERPL-SCNC: 138 MMOL/L (ref 136–145)
VIT B12 SERPL-MCNC: 1252 PG/ML (ref 211–911)

## 2024-12-20 PROCEDURE — 80048 BASIC METABOLIC PNL TOTAL CA: CPT

## 2024-12-20 PROCEDURE — 36415 COLL VENOUS BLD VENIPUNCTURE: CPT

## 2024-12-20 PROCEDURE — 82607 VITAMIN B-12: CPT

## 2024-12-23 ASSESSMENT — ENCOUNTER SYMPTOMS
SHORTNESS OF BREATH: 0
DIZZINESS: 0
HEADACHES: 0
LIGHT-HEADEDNESS: 0

## 2024-12-23 NOTE — PROGRESS NOTES
Subjective   Patient ID: Marie Trammell is a 69 y.o. female who presents for Blood Pressure Check (Pt is here for bp check / nr).    HPI   Last seen 1mo ago w/excessive fatigue. She had difficulty falling asleep, waking up frequently throughout the night. Excessive daytime sleepiness (STOP-BANG 2). She was experiencing chronic intrusive thoughts, high stress due to mom in assisted living facility. Recently fell and fractured her femur, currently in the hospital    She was started on prozac 10mg last visit. Notes improvement in anxiety as well as sleep - no difficulties falling asleep. Avg 4 hours restful sleep before she wakes up again and has difficulty falling back asleep. She feels okay in the morning but has severe fatigue as the day progresses. Takes naps daily, approx 2 hours at a time. Has never tried any other sleep medications.    Fatigue - does have hx of thalassemia minor. Has elevated ferritin + vitamin B12. Has upcoming FU w/heme onc in march.    HTN - home readings avg 150/80. She was started on 25mg losartan and is tolerating well. Recent home BP avg 135/79. BMP w/stable K+ and GFR.    Today:  136/74 in office  142/73 machine      Review of Systems   Constitutional:  Positive for fatigue.   Respiratory:  Negative for shortness of breath.    Cardiovascular:  Negative for chest pain.   Neurological:  Negative for dizziness, light-headedness and headaches.   Psychiatric/Behavioral:  Positive for sleep disturbance.        Objective   /74   Pulse 75   Wt 71.6 kg (157 lb 12.8 oz)   SpO2 95%   BMI 26.26 kg/m²     Physical Exam  Constitutional:       Appearance: Normal appearance.   HENT:      Head: Normocephalic and atraumatic.   Eyes:      Extraocular Movements: Extraocular movements intact.      Conjunctiva/sclera: Conjunctivae normal.   Cardiovascular:      Rate and Rhythm: Normal rate and regular rhythm.      Heart sounds: Normal heart sounds.   Pulmonary:      Effort: Pulmonary effort is  normal.      Breath sounds: Normal breath sounds. No wheezing or rhonchi.   Musculoskeletal:      Cervical back: Normal range of motion and neck supple.   Skin:     General: Skin is warm.      Findings: No rash.   Neurological:      General: No focal deficit present.      Mental Status: She is alert.         Assessment/Plan   Problem List Items Addressed This Visit    None  Visit Diagnoses         Codes    Essential (primary) hypertension    -  Primary I10    MARY (generalized anxiety disorder)     F41.1          Continue prozac at current dose, start unisom at bedtime. Give update in 2 weeks.    BP - improving. Not quite at goal but hoping w/improved sleep/stress will have improvement in BP

## 2024-12-27 ENCOUNTER — OFFICE VISIT (OUTPATIENT)
Dept: PRIMARY CARE | Facility: CLINIC | Age: 69
End: 2024-12-27
Payer: MEDICARE

## 2024-12-27 VITALS
HEART RATE: 75 BPM | BODY MASS INDEX: 26.26 KG/M2 | SYSTOLIC BLOOD PRESSURE: 136 MMHG | WEIGHT: 157.8 LBS | OXYGEN SATURATION: 95 % | DIASTOLIC BLOOD PRESSURE: 74 MMHG

## 2024-12-27 DIAGNOSIS — I10 ESSENTIAL (PRIMARY) HYPERTENSION: Primary | ICD-10-CM

## 2024-12-27 DIAGNOSIS — F41.1 GAD (GENERALIZED ANXIETY DISORDER): ICD-10-CM

## 2024-12-27 PROCEDURE — 3078F DIAST BP <80 MM HG: CPT | Performed by: PHYSICIAN ASSISTANT

## 2024-12-27 PROCEDURE — 1123F ACP DISCUSS/DSCN MKR DOCD: CPT | Performed by: PHYSICIAN ASSISTANT

## 2024-12-27 PROCEDURE — 1126F AMNT PAIN NOTED NONE PRSNT: CPT | Performed by: PHYSICIAN ASSISTANT

## 2024-12-27 PROCEDURE — 1157F ADVNC CARE PLAN IN RCRD: CPT | Performed by: PHYSICIAN ASSISTANT

## 2024-12-27 PROCEDURE — 1036F TOBACCO NON-USER: CPT | Performed by: PHYSICIAN ASSISTANT

## 2024-12-27 PROCEDURE — 99214 OFFICE O/P EST MOD 30 MIN: CPT | Performed by: PHYSICIAN ASSISTANT

## 2024-12-27 PROCEDURE — 1158F ADVNC CARE PLAN TLK DOCD: CPT | Performed by: PHYSICIAN ASSISTANT

## 2024-12-27 PROCEDURE — 1160F RVW MEDS BY RX/DR IN RCRD: CPT | Performed by: PHYSICIAN ASSISTANT

## 2024-12-27 PROCEDURE — 1159F MED LIST DOCD IN RCRD: CPT | Performed by: PHYSICIAN ASSISTANT

## 2024-12-27 PROCEDURE — 3075F SYST BP GE 130 - 139MM HG: CPT | Performed by: PHYSICIAN ASSISTANT

## 2024-12-27 ASSESSMENT — ENCOUNTER SYMPTOMS
SLEEP DISTURBANCE: 1
FATIGUE: 1

## 2024-12-27 ASSESSMENT — COLUMBIA-SUICIDE SEVERITY RATING SCALE - C-SSRS
6. HAVE YOU EVER DONE ANYTHING, STARTED TO DO ANYTHING, OR PREPARED TO DO ANYTHING TO END YOUR LIFE?: NO
2. HAVE YOU ACTUALLY HAD ANY THOUGHTS OF KILLING YOURSELF?: NO
1. IN THE PAST MONTH, HAVE YOU WISHED YOU WERE DEAD OR WISHED YOU COULD GO TO SLEEP AND NOT WAKE UP?: NO

## 2024-12-27 ASSESSMENT — PATIENT HEALTH QUESTIONNAIRE - PHQ9
2. FEELING DOWN, DEPRESSED OR HOPELESS: NOT AT ALL
SUM OF ALL RESPONSES TO PHQ9 QUESTIONS 1 AND 2: 0
1. LITTLE INTEREST OR PLEASURE IN DOING THINGS: NOT AT ALL

## 2024-12-27 ASSESSMENT — PAIN SCALES - GENERAL: PAINLEVEL_OUTOF10: 0-NO PAIN

## 2025-01-02 ENCOUNTER — PATIENT MESSAGE (OUTPATIENT)
Dept: PRIMARY CARE | Facility: CLINIC | Age: 70
End: 2025-01-02
Payer: MEDICARE

## 2025-01-25 ENCOUNTER — PATIENT MESSAGE (OUTPATIENT)
Dept: PRIMARY CARE | Facility: CLINIC | Age: 70
End: 2025-01-25
Payer: MEDICARE

## 2025-01-27 DIAGNOSIS — F41.1 GAD (GENERALIZED ANXIETY DISORDER): ICD-10-CM

## 2025-01-27 RX ORDER — FLUOXETINE 10 MG/1
10 CAPSULE ORAL DAILY
Qty: 90 CAPSULE | Refills: 1 | Status: SHIPPED | OUTPATIENT
Start: 2025-01-27 | End: 2025-07-26

## 2025-03-11 ENCOUNTER — OFFICE VISIT (OUTPATIENT)
Dept: HEMATOLOGY/ONCOLOGY | Facility: CLINIC | Age: 70
End: 2025-03-11
Payer: MEDICARE

## 2025-03-11 VITALS
DIASTOLIC BLOOD PRESSURE: 75 MMHG | SYSTOLIC BLOOD PRESSURE: 140 MMHG | HEART RATE: 67 BPM | OXYGEN SATURATION: 95 % | TEMPERATURE: 98.2 F | HEIGHT: 65 IN | BODY MASS INDEX: 26.5 KG/M2 | WEIGHT: 159.06 LBS | RESPIRATION RATE: 18 BRPM

## 2025-03-11 DIAGNOSIS — R71.8 MICROCYTOSIS: ICD-10-CM

## 2025-03-11 DIAGNOSIS — R53.82 CHRONIC FATIGUE: Primary | ICD-10-CM

## 2025-03-11 DIAGNOSIS — E03.9 ACQUIRED HYPOTHYROIDISM: ICD-10-CM

## 2025-03-11 DIAGNOSIS — R59.1 LYMPHADENOPATHY: ICD-10-CM

## 2025-03-11 DIAGNOSIS — D56.3 THALASSEMIA MINOR: ICD-10-CM

## 2025-03-11 LAB
ALBUMIN SERPL BCP-MCNC: 4.3 G/DL (ref 3.4–5)
ALP SERPL-CCNC: 78 U/L (ref 33–136)
ALT SERPL W P-5'-P-CCNC: 12 U/L (ref 7–45)
ANION GAP SERPL CALC-SCNC: 11 MMOL/L (ref 10–20)
AST SERPL W P-5'-P-CCNC: 10 U/L (ref 9–39)
BASO STIPL BLD QL SMEAR: PRESENT
BASOPHILS # BLD AUTO: 0.07 X10*3/UL (ref 0–0.1)
BASOPHILS NFR BLD AUTO: 0.9 %
BILIRUB SERPL-MCNC: 0.8 MG/DL (ref 0–1.2)
BUN SERPL-MCNC: 13 MG/DL (ref 6–23)
CALCIUM SERPL-MCNC: 9.3 MG/DL (ref 8.6–10.3)
CHLORIDE SERPL-SCNC: 105 MMOL/L (ref 98–107)
CMV IGG AVIDITY SERPL IA-RTO: REACTIVE %
CO2 SERPL-SCNC: 26 MMOL/L (ref 21–32)
CREAT SERPL-MCNC: 0.49 MG/DL (ref 0.5–1.05)
DACRYOCYTES BLD QL SMEAR: NORMAL
EBV EA IGG SER QL: POSITIVE
EBV NA AB SER QL: POSITIVE
EBV VCA IGG SER IA-ACNC: POSITIVE
EBV VCA IGM SER IA-ACNC: NEGATIVE
EGFRCR SERPLBLD CKD-EPI 2021: >90 ML/MIN/1.73M*2
EOSINOPHIL # BLD AUTO: 0.2 X10*3/UL (ref 0–0.7)
EOSINOPHIL NFR BLD AUTO: 2.5 %
ERYTHROCYTE [DISTWIDTH] IN BLOOD BY AUTOMATED COUNT: 23.1 % (ref 11.5–14.5)
FERRITIN SERPL-MCNC: 172 NG/ML (ref 8–150)
GLUCOSE SERPL-MCNC: 129 MG/DL (ref 74–99)
HBV CORE IGM SER QL: NONREACTIVE
HBV SURFACE AB SER-ACNC: <3.1 MIU/ML
HBV SURFACE AG SERPL QL IA: NONREACTIVE
HCT VFR BLD AUTO: 35.1 % (ref 36–46)
HCV AB SER QL: NONREACTIVE
HGB BLD-MCNC: 11.1 G/DL (ref 12–16)
HIV 1+2 AB+HIV1 P24 AG SERPL QL IA: NONREACTIVE
HYPOCHROMIA BLD QL SMEAR: NORMAL
IGA SERPL-MCNC: 262 MG/DL (ref 70–400)
IGG SERPL-MCNC: 1020 MG/DL (ref 700–1600)
IGM SERPL-MCNC: 113 MG/DL (ref 40–230)
IMM GRANULOCYTES # BLD AUTO: 0.04 X10*3/UL (ref 0–0.7)
IMM GRANULOCYTES NFR BLD AUTO: 0.5 % (ref 0–0.9)
IRON SATN MFR SERPL: 45 % (ref 25–45)
IRON SERPL-MCNC: 158 UG/DL (ref 35–150)
LYMPHOCYTES # BLD AUTO: 2.25 X10*3/UL (ref 1.2–4.8)
LYMPHOCYTES NFR BLD AUTO: 28.2 %
MCH RBC QN AUTO: 21.9 PG (ref 26–34)
MCHC RBC AUTO-ENTMCNC: 31.6 G/DL (ref 32–36)
MCV RBC AUTO: 69 FL (ref 80–100)
MONOCYTES # BLD AUTO: 0.68 X10*3/UL (ref 0.1–1)
MONOCYTES NFR BLD AUTO: 8.5 %
NEUTROPHILS # BLD AUTO: 4.73 X10*3/UL (ref 1.2–7.7)
NEUTROPHILS NFR BLD AUTO: 59.4 %
NRBC BLD-RTO: 0.5 /100 WBCS (ref 0–0)
OVALOCYTES BLD QL SMEAR: NORMAL
PLATELET # BLD AUTO: 345 X10*3/UL (ref 150–450)
POLYCHROMASIA BLD QL SMEAR: NORMAL
POTASSIUM SERPL-SCNC: 3.5 MMOL/L (ref 3.5–5.3)
PROT SERPL-MCNC: 6.8 G/DL (ref 6.4–8.2)
PROT SERPL-MCNC: 7.2 G/DL (ref 6.4–8.2)
RBC # BLD AUTO: 5.08 X10*6/UL (ref 4–5.2)
RBC MORPH BLD: NORMAL
SODIUM SERPL-SCNC: 138 MMOL/L (ref 136–145)
TARGETS BLD QL SMEAR: NORMAL
TIBC SERPL-MCNC: 351 UG/DL (ref 240–445)
UIBC SERPL-MCNC: 193 UG/DL (ref 110–370)
VIT B12 SERPL-MCNC: 848 PG/ML (ref 211–911)
WBC # BLD AUTO: 8 X10*3/UL (ref 4.4–11.3)

## 2025-03-11 PROCEDURE — 87340 HEPATITIS B SURFACE AG IA: CPT | Performed by: NURSE PRACTITIONER

## 2025-03-11 PROCEDURE — 99205 OFFICE O/P NEW HI 60 MIN: CPT | Performed by: NURSE PRACTITIONER

## 2025-03-11 PROCEDURE — 1126F AMNT PAIN NOTED NONE PRSNT: CPT | Performed by: NURSE PRACTITIONER

## 2025-03-11 PROCEDURE — 83540 ASSAY OF IRON: CPT | Performed by: NURSE PRACTITIONER

## 2025-03-11 PROCEDURE — 1157F ADVNC CARE PLAN IN RCRD: CPT | Performed by: NURSE PRACTITIONER

## 2025-03-11 PROCEDURE — 82607 VITAMIN B-12: CPT | Performed by: NURSE PRACTITIONER

## 2025-03-11 PROCEDURE — 99215 OFFICE O/P EST HI 40 MIN: CPT | Performed by: NURSE PRACTITIONER

## 2025-03-11 PROCEDURE — 83521 IG LIGHT CHAINS FREE EACH: CPT | Performed by: NURSE PRACTITIONER

## 2025-03-11 PROCEDURE — 85025 COMPLETE CBC W/AUTO DIFF WBC: CPT | Performed by: NURSE PRACTITIONER

## 2025-03-11 PROCEDURE — 82728 ASSAY OF FERRITIN: CPT | Performed by: NURSE PRACTITIONER

## 2025-03-11 PROCEDURE — 86644 CMV ANTIBODY: CPT | Performed by: NURSE PRACTITIONER

## 2025-03-11 PROCEDURE — 86705 HEP B CORE ANTIBODY IGM: CPT | Performed by: NURSE PRACTITIONER

## 2025-03-11 PROCEDURE — 87389 HIV-1 AG W/HIV-1&-2 AB AG IA: CPT | Performed by: NURSE PRACTITIONER

## 2025-03-11 PROCEDURE — 84155 ASSAY OF PROTEIN SERUM: CPT | Performed by: NURSE PRACTITIONER

## 2025-03-11 PROCEDURE — 86334 IMMUNOFIX E-PHORESIS SERUM: CPT | Performed by: NURSE PRACTITIONER

## 2025-03-11 PROCEDURE — 86645 CMV ANTIBODY IGM: CPT | Performed by: NURSE PRACTITIONER

## 2025-03-11 PROCEDURE — 3008F BODY MASS INDEX DOCD: CPT | Performed by: NURSE PRACTITIONER

## 2025-03-11 PROCEDURE — 1123F ACP DISCUSS/DSCN MKR DOCD: CPT | Performed by: NURSE PRACTITIONER

## 2025-03-11 PROCEDURE — 86664 EPSTEIN-BARR NUCLEAR ANTIGEN: CPT | Performed by: NURSE PRACTITIONER

## 2025-03-11 PROCEDURE — 1159F MED LIST DOCD IN RCRD: CPT | Performed by: NURSE PRACTITIONER

## 2025-03-11 PROCEDURE — 86803 HEPATITIS C AB TEST: CPT | Performed by: NURSE PRACTITIONER

## 2025-03-11 PROCEDURE — 80053 COMPREHEN METABOLIC PANEL: CPT | Performed by: NURSE PRACTITIONER

## 2025-03-11 PROCEDURE — 82784 ASSAY IGA/IGD/IGG/IGM EACH: CPT | Performed by: NURSE PRACTITIONER

## 2025-03-11 PROCEDURE — 86706 HEP B SURFACE ANTIBODY: CPT | Performed by: NURSE PRACTITIONER

## 2025-03-11 RX ORDER — DOXYCYCLINE 100 MG/1
100 CAPSULE ORAL 2 TIMES DAILY
Qty: 14 CAPSULE | Refills: 0 | Status: SHIPPED | OUTPATIENT
Start: 2025-03-11 | End: 2025-03-18

## 2025-03-11 RX ORDER — CHOLECALCIFEROL (VITAMIN D3) 25 MCG
2000 TABLET ORAL DAILY
COMMUNITY

## 2025-03-11 RX ORDER — LEVOTHYROXINE SODIUM 25 UG/1
25 TABLET ORAL
Qty: 90 TABLET | Refills: 3 | Status: SHIPPED | OUTPATIENT
Start: 2025-03-11

## 2025-03-11 ASSESSMENT — ENCOUNTER SYMPTOMS
CONSTIPATION: 0
ARTHRALGIAS: 0
FATIGUE: 1
HEMATOLOGIC/LYMPHATIC NEGATIVE: 1
NUMBNESS: 0
HEMOPTYSIS: 0
UNEXPECTED WEIGHT CHANGE: 0
BRUISES/BLEEDS EASILY: 0
NAUSEA: 0
DIAPHORESIS: 0
BACK PAIN: 0
HEADACHES: 1
COUGH: 0
CHEST TIGHTNESS: 0
BLOOD IN STOOL: 0
FLANK PAIN: 0
EYE PROBLEMS: 0
MUSCULOSKELETAL NEGATIVE: 1
LIGHT-HEADEDNESS: 0
SHORTNESS OF BREATH: 0
RESPIRATORY NEGATIVE: 1
EYES NEGATIVE: 1
MYALGIAS: 0
CHILLS: 0
TROUBLE SWALLOWING: 1
DIARRHEA: 0
ADENOPATHY: 0
WHEEZING: 0
ABDOMINAL PAIN: 0
FEVER: 0
ABDOMINAL DISTENTION: 0
CARDIOVASCULAR NEGATIVE: 1
EXTREMITY WEAKNESS: 0
PALPITATIONS: 0
GASTROINTESTINAL NEGATIVE: 1
SORE THROAT: 1
LEG SWELLING: 0
DIZZINESS: 0

## 2025-03-11 ASSESSMENT — PAIN SCALES - GENERAL: PAINLEVEL_OUTOF10: 0-NO PAIN

## 2025-03-11 NOTE — PROGRESS NOTES
Patient ID: Marie Trammell is a 69 y.o. female.  Referring Physician: Lou Romero PA-C  [address] [address]  [place] [address]  Primary Care Provider: Lou Romero PA-C  Visit Type: Initial Visit      Subjective    HPI  68 yo referred for evaluation of thalassemia which she was diagnosed with at 12yo.  She states she has had fatigue since 12yo and that is really the reason for her visit.  She has history of migraines, also initiated at 12yo.  Labs done 11/18/24 showed WBC 10.5, hgb 12.3 and plt 357 with MCV 69.   She was taking B12 supplements and was told to stop when B12 resulted at 1252.  She was negative for HCV.   She states she was under quite a bit of stress this summer at which time her fatigue became significantly worse.  She relates this fatigue to the  point of falling asleep if she is sitting still for too long.   She states she eats a good diet.  In the past 2-3 months she has developed sinus congestion and drainage with yellow rhinorrhea and discharge from ear as well.  She has dry painful throat in the morning.  She does not have difficulty swallowing.  She has noted bilateral painful cervical and submandibular lymph nodes.  She denies fevers.  She has not seen ENT or been treated for this.  She denies night sweats or other lymphadenopathy.   She denies cough or congestions and states she quit smoking.   She denies abdominal pain or diarrhea.  She is being treated for hypothyroidism.     Review of Systems   Constitutional:  Positive for fatigue. Negative for chills, diaphoresis, fever and unexpected weight change.   HENT:   Positive for lump/mass, sore throat and trouble swallowing. Negative for hearing loss, mouth sores, nosebleeds and tinnitus.    Eyes: Negative.  Negative for eye problems.   Respiratory: Negative.  Negative for chest tightness, cough, hemoptysis, shortness of breath and wheezing.    Cardiovascular: Negative.  Negative for chest pain, leg swelling and palpitations.  "  Gastrointestinal: Negative.  Negative for abdominal distention, abdominal pain, blood in stool, constipation, diarrhea and nausea.   Musculoskeletal: Negative.  Negative for arthralgias, back pain, flank pain, gait problem and myalgias.   Skin: Negative.  Negative for itching and rash.   Neurological:  Positive for headaches. Negative for dizziness, extremity weakness, gait problem, light-headedness and numbness.   Hematological: Negative.  Negative for adenopathy. Does not bruise/bleed easily.      Objective   BSA: 1.82 meters squared  /75   Pulse 67   Temp 36.8 °C (98.2 °F) (Temporal)   Resp 18   Ht (S) 1.645 m (5' 4.76\")   Wt 72.1 kg (159 lb 1 oz)   SpO2 95%   BMI 26.66 kg/m²      has a past medical history of Disease of thyroid gland.   has a past surgical history that includes Breast biopsy (Left) and [place] tooth extraction (Unknown).  Family History   Problem Relation Name Age of Onset    Macular degeneration Mother Bridgett Rivera     Glaucoma Mother Bridgett Rivera     Atrial fibrillation Mother Bridgett Rivera     Heart disease Mother Bridgett Rivera     Vision loss Mother Bridgett Rivera     Cancer Father Andres Josue     Blood Disorder Father's Sister Tj Mondragon     COPD Sister Brook Trammell  reports that she quit smoking about 3 years ago. Her smoking use included cigarettes. She has quit using smokeless tobacco.  She  reports current alcohol use of about 1.0 standard drink of alcohol per week.  She  reports no history of drug use.    Physical Exam  Constitutional:       Appearance: Normal appearance.   HENT:      Head:      Comments: No sinus tenderness     Mouth/Throat:      Mouth: Mucous membranes are moist.      Pharynx: Oropharynx is clear.      Comments: Fissure central tongue.   No visible lesions or ulcers.   Eyes:      Conjunctiva/sclera: Conjunctivae normal.      Pupils: Pupils are equal, round, and reactive to light.   Neck:      Comments: Patient " states she has painful lymphadenopathy, but unappreciated on exam  Cardiovascular:      Rate and Rhythm: Normal rate and regular rhythm.      Pulses: Normal pulses.      Heart sounds: Normal heart sounds. No murmur heard.  Pulmonary:      Effort: Pulmonary effort is normal.      Breath sounds: Normal breath sounds. No stridor. No wheezing, rhonchi or rales.   Abdominal:      General: There is no distension.      Palpations: There is no mass.      Tenderness: There is no abdominal tenderness.   Musculoskeletal:         General: No swelling, tenderness or deformity. Normal range of motion.   Skin:     Coloration: Skin is pale. Skin is not jaundiced.      Findings: No bruising.   Neurological:      Motor: No weakness.     WBC   Date/Time Value Ref Range Status   11/18/2024 10:28 AM 10.5 4.4 - 11.3 x10*3/uL Final   05/20/2024 07:34 AM 11.3 4.4 - 11.3 x10*3/uL Final   04/04/2023 08:49 AM 11.1 (H) 4.5 - 11.0 K/UL Final   04/19/2022 01:10 PM 8.5 4.5 - 11.0 K/UL Final     nRBC   Date Value Ref Range Status   11/18/2024 0.2 (H) 0.0 - 0.0 /100 WBCs Final   05/20/2024 0.3 (H) 0.0 - 0.0 /100 WBCs Final   04/04/2023 0 0 /100 WBC Final   04/19/2022 0 0 /100 WBC Final     RBC   Date Value Ref Range Status   11/18/2024 5.50 (H) 4.00 - 5.20 x10*6/uL Final   05/20/2024 5.01 4.00 - 5.20 x10*6/uL Final   04/04/2023 5.15 (H) 4.0 - 4.9 M/UL Final   04/19/2022 5.34 (H) 4.0 - 4.9 M/UL Final     Hemoglobin   Date Value Ref Range Status   11/18/2024 12.3 12.0 - 16.0 g/dL Final   05/20/2024 11.3 (L) 12.0 - 16.0 g/dL Final   04/04/2023 11.8 (L) 12.0 - 15.0 GM/DL Final   04/19/2022 11.6 (L) 12.0 - 15.0 GM/DL Final     Hematocrit   Date Value Ref Range Status   11/18/2024 38.0 36.0 - 46.0 % Final   05/20/2024 35.5 (L) 36.0 - 46.0 % Final   04/04/2023 36.5 36 - 44 % Final   04/19/2022 36.3 36 - 44 % Final     MCV   Date/Time Value Ref Range Status   11/18/2024 10:28 AM 69 (L) 80 - 100 fL Final   05/20/2024 07:34 AM 71 (L) 80 - 100 fL Final    04/04/2023 08:49 AM 70.9 (L) 80 - 100 FL Final   04/19/2022 01:10 PM 68.0 (L) 80 - 100 FL Final     MCH   Date/Time Value Ref Range Status   11/18/2024 10:28 AM 22.4 (L) 26.0 - 34.0 pg Final   05/20/2024 07:34 AM 22.6 (L) 26.0 - 34.0 pg Final   04/04/2023 08:49 AM 22.9 (L) 26 - 34 PG Final     MCHC   Date/Time Value Ref Range Status   11/18/2024 10:28 AM 32.4 32.0 - 36.0 g/dL Final   05/20/2024 07:34 AM 31.8 (L) 32.0 - 36.0 g/dL Final   04/04/2023 08:49 AM 32.3 31 - 37 % Final   04/19/2022 01:10 PM 32.0 31 - 37 % Final     RDW   Date/Time Value Ref Range Status   11/18/2024 10:28 AM 22.5 (H) 11.5 - 14.5 % Final   05/20/2024 07:34 AM 22.4 (H) 11.5 - 14.5 % Final     Platelets   Date/Time Value Ref Range Status   11/18/2024 10:28  150 - 450 x10*3/uL Final   05/20/2024 07:34  150 - 450 x10*3/uL Final   04/04/2023 08:49  150 - 450 K/UL Final   04/19/2022 01:10  150 - 450 K/UL Final     MPV   Date/Time Value Ref Range Status   04/04/2023 08:49 AM 11.1 7.0 - 12.6 CU Final   04/19/2022 01:10 PM 10.9 7.0 - 12.6 CU Final     Neutrophils %   Date/Time Value Ref Range Status   11/18/2024 10:28 AM 60.4 40.0 - 80.0 % Final   05/20/2024 07:34 AM 57.8 40.0 - 80.0 % Final     Immature Granulocytes %, Automated   Date/Time Value Ref Range Status   11/18/2024 10:28 AM 0.6 0.0 - 0.9 % Final     Comment:     Immature Granulocyte Count (IG) includes promyelocytes, myelocytes and metamyelocytes but does not include bands. Percent differential counts (%) should be interpreted in the context of the absolute cell counts (cells/UL).   05/20/2024 07:34 AM 0.9 0.0 - 0.9 % Final     Comment:     Immature Granulocyte Count (IG) includes promyelocytes, myelocytes and metamyelocytes but does not include bands. Percent differential counts (%) should be interpreted in the context of the absolute cell counts (cells/UL).     Lymphocytes %   Date/Time Value Ref Range Status   11/18/2024 10:28 AM 27.8 13.0 - 44.0 % Final    05/20/2024 07:34 AM 29.9 13.0 - 44.0 % Final   04/04/2023 08:49 AM 34.20 20 - 40 % Final   04/19/2022 01:10 PM 45.40 (H) 20 - 40 % Final     Monocytes %   Date/Time Value Ref Range Status   11/18/2024 10:28 AM 7.7 2.0 - 10.0 % Final   05/20/2024 07:34 AM 7.6 2.0 - 10.0 % Final   04/04/2023 08:49 AM 9.50 (H) 0 - 8 % Final   04/19/2022 01:10 PM 7.60 0 - 8 % Final     Eosinophils %   Date/Time Value Ref Range Status   11/18/2024 10:28 AM 2.5 0.0 - 6.0 % Final   05/20/2024 07:34 AM 2.9 0.0 - 6.0 % Final     Basophils %   Date/Time Value Ref Range Status   11/18/2024 10:28 AM 1.0 0.0 - 2.0 % Final   05/20/2024 07:34 AM 0.9 0.0 - 2.0 % Final   04/04/2023 08:49 AM 1.10 (H) 0 - 1 % Final   04/19/2022 01:10 PM 0.90 0 - 1 % Final     Neutrophils Absolute   Date/Time Value Ref Range Status   11/18/2024 10:28 AM 6.36 1.20 - 7.70 x10*3/uL Final     Comment:     Percent differential counts (%) should be interpreted in the context of the absolute cell counts (cells/uL).   05/20/2024 07:34 AM 6.52 1.20 - 7.70 x10*3/uL Final     Comment:     Percent differential counts (%) should be interpreted in the context of the absolute cell counts (cells/uL).   04/04/2023 08:49 AM 5.76 1.8 - 7.7 K/UL Final   04/19/2022 01:10 PM 3.52 1.8 - 7.7 K/UL Final     Immature Granulocytes Absolute, Automated   Date/Time Value Ref Range Status   11/18/2024 10:28 AM 0.06 0.00 - 0.70 x10*3/uL Final   05/20/2024 07:34 AM 0.10 0.00 - 0.70 x10*3/uL Final   04/04/2023 08:49 AM 0.05 0.0 - 0.1 K/UL Final   04/19/2022 01:10 PM 0.03 0.0 - 0.1 K/UL Final     Lymphocytes Absolute   Date/Time Value Ref Range Status   11/18/2024 10:28 AM 2.92 1.20 - 4.80 x10*3/uL Final   05/20/2024 07:34 AM 3.38 1.20 - 4.80 x10*3/uL Final   04/04/2023 08:49 AM 3.80 (H) 1.2 - 3.2 K/UL Final   04/19/2022 01:10 PM 3.84 (H) 1.2 - 3.2 K/UL Final     Monocytes Absolute   Date/Time Value Ref Range Status   11/18/2024 10:28 AM 0.81 0.10 - 1.00 x10*3/uL Final   05/20/2024 07:34 AM 0.86 0.10  "- 1.00 x10*3/uL Final   04/04/2023 08:49 AM 1.05 (H) 0 - 0.8 K/UL Final   04/19/2022 01:10 PM 0.64 0 - 0.8 K/UL Final     Eosinophils Absolute   Date/Time Value Ref Range Status   11/18/2024 10:28 AM 0.26 0.00 - 0.70 x10*3/uL Final   05/20/2024 07:34 AM 0.33 0.00 - 0.70 x10*3/uL Final   04/04/2023 08:49 AM 0.33 0 - 0.45 K/UL Final   04/19/2022 01:10 PM 0.34 0 - 0.45 K/UL Final     Basophils Absolute   Date/Time Value Ref Range Status   11/18/2024 10:28 AM 0.11 (H) 0.00 - 0.10 x10*3/uL Final   05/20/2024 07:34 AM 0.10 0.00 - 0.10 x10*3/uL Final   04/04/2023 08:49 AM 0.12 0.00 - 0.22 K/UL Final   04/19/2022 01:10 PM 0.08 0.00 - 0.22 K/UL Final       Assessment/Plan   Thalassema minor diagnosed at 14yo with current hgb 12.3 and MCV 69 which are normal findings in patient with thalassemia.   Do not need to repeat genetic test already confirmed and do not need to treat hgb 12.3    Fatigue since 14yo by description has gotten significantly worse this summer/early fall.  She states she eats good diet and is not trying to lose weight.  Will check viral titers.  With reports of \"falling asleep any time\" she should maybe pursue evaluate for narcolepsy.   She will talk to PCP regarding referral.      Sinus congestion, yellow rhinorrhea, ear drainage, painful lymphadenopathy will treat with antibiotic doxycycline to see if that clears her symptoms.  She is allergic to PCN, erythromycin and cephalosporins so need to avoid those.     Hypothryoidism managed by PCP, if not managed well could result in fatigue.     B12 deficiency with level 1252 on 11/18/24, will check level since she has been off it.  May need reduced dose vs holding B12       Diagnoses and all orders for this visit:  Chronic fatigue  -     CBC and Auto Differential; Future  -     CMV IgG, IgM; Future  -     Comprehensive Metabolic Panel; Future  -     Lissy-Barr Virus Antibody Panel; Future  -     Ferritin; Future  -     Iron and TIBC; Future  -     Hepatitis B " Core Antibody, Igm; Future  -     Hepatitis B Surface Antigen; Future  -     Hepatitis B Surface Antibody; Future  -     Hepatitis C Antibody; Future  -     HIV 1/2 Antigen/Antibody Screen with Reflex to Confirmation; Future  -     Immunoglobulins (IgG, IgA, IgM); Future  -     [place]/Lambda Free Light Chain, Serum; Future  -     Serum Protein Electrophoresis + Immunofixation; Future  -     Vitamin B12; Future  Thalassemia minor  -     Referral to Hematology and Oncology  Microcytosis  -     Ferritin; Future  -     Iron and TIBC; Future  Lymphadenopathy  -     doxycycline (Vibramycin) 100 mg capsule; Take 1 capsule (100 mg) by mouth 2 times a day for 7 days. Take with at least 8 ounces (large glass) of water, do not lie down for 30 minutes after  -     Clinic Appointment Request Follow up; Future  -     CBC and Auto Differential; Future           Rhiannon Neff PA-C

## 2025-03-11 NOTE — PROGRESS NOTES
Patient here for follow up visit with Rhiannon Neff for Dx of thalassmia minor   Patient here alone.    Medications and Allergies reviewed and reconciled this visit.    No concerns or complaints noted at this time.     Pt reports appetite is  good.  Pt with increased fatigue. Vitamin b12 level elevated hence referral. Pt with c/o sinus pressure, yellow drainage and swollen lymph nodes to neck. She denies any weight loss, fever, chills or night sweats.     Follow up per  PA  request.    Pt. reports availability and use of mychart, Reviewed this is a good place to communicate with the team as well as review labs and upcoming orders.     No barriers to education noted, patient agrees to current plan and verbalized understanding using teach back method.

## 2025-03-12 LAB
KAPPA LC SERPL-MCNC: 1.7 MG/DL (ref 0.33–1.94)
KAPPA LC/LAMBDA SER: 1.06 {RATIO} (ref 0.26–1.65)
LAMBDA LC SERPL-MCNC: 1.6 MG/DL (ref 0.57–2.63)

## 2025-03-13 LAB — CMV IGM SERPL-ACNC: <8 AU/ML

## 2025-03-14 LAB
ALBUMIN: 4.6 G/DL (ref 3.4–5)
ALPHA 1 GLOBULIN: 0.3 G/DL (ref 0.2–0.6)
ALPHA 2 GLOBULIN: 0.6 G/DL (ref 0.4–1.1)
BETA GLOBULIN: 0.8 G/DL (ref 0.5–1.2)
GAMMA GLOBULIN: 1 G/DL (ref 0.5–1.4)
IMMUNOFIXATION COMMENT: NORMAL
PATH REVIEW - SERUM IMMUNOFIXATION: NORMAL
PATH REVIEW-SERUM PROTEIN ELECTROPHORESIS: NORMAL
PROTEIN ELECTROPHORESIS COMMENT: NORMAL

## 2025-05-10 ENCOUNTER — PATIENT MESSAGE (OUTPATIENT)
Dept: PRIMARY CARE | Facility: CLINIC | Age: 70
End: 2025-05-10
Payer: MEDICARE

## 2025-05-10 DIAGNOSIS — R03.0 ELEVATED BLOOD PRESSURE READING: ICD-10-CM

## 2025-05-11 RX ORDER — LOSARTAN POTASSIUM 25 MG/1
25 TABLET ORAL DAILY
Qty: 90 TABLET | Refills: 1 | Status: SHIPPED | OUTPATIENT
Start: 2025-05-11 | End: 2025-11-07

## 2025-05-29 ENCOUNTER — OFFICE VISIT (OUTPATIENT)
Dept: PRIMARY CARE | Facility: CLINIC | Age: 70
End: 2025-05-29
Payer: MEDICARE

## 2025-05-29 VITALS
DIASTOLIC BLOOD PRESSURE: 72 MMHG | OXYGEN SATURATION: 97 % | BODY MASS INDEX: 26.35 KG/M2 | SYSTOLIC BLOOD PRESSURE: 120 MMHG | HEART RATE: 69 BPM | WEIGHT: 157.2 LBS

## 2025-05-29 DIAGNOSIS — Z00.00 ROUTINE MEDICAL EXAM: Primary | ICD-10-CM

## 2025-05-29 DIAGNOSIS — Z87.19 HISTORY OF DUODENAL ULCER: ICD-10-CM

## 2025-05-29 DIAGNOSIS — Z53.20 LUNG CANCER SCREENING DECLINED BY PATIENT: ICD-10-CM

## 2025-05-29 DIAGNOSIS — Z12.31 BREAST CANCER SCREENING BY MAMMOGRAM: ICD-10-CM

## 2025-05-29 DIAGNOSIS — M54.2 NECK PAIN: ICD-10-CM

## 2025-05-29 DIAGNOSIS — F41.1 GENERALIZED ANXIETY DISORDER: ICD-10-CM

## 2025-05-29 DIAGNOSIS — F41.1 GAD (GENERALIZED ANXIETY DISORDER): ICD-10-CM

## 2025-05-29 DIAGNOSIS — Z13.6 SCREENING, ISCHEMIC HEART DISEASE: ICD-10-CM

## 2025-05-29 DIAGNOSIS — E78.5 DYSLIPIDEMIA: ICD-10-CM

## 2025-05-29 DIAGNOSIS — D56.3 HETEROZYGOUS THALASSEMIA: ICD-10-CM

## 2025-05-29 DIAGNOSIS — J32.9 RECURRENT SINUSITIS: ICD-10-CM

## 2025-05-29 DIAGNOSIS — Z12.11 SCREEN FOR COLON CANCER: ICD-10-CM

## 2025-05-29 DIAGNOSIS — I10 ESSENTIAL (PRIMARY) HYPERTENSION: ICD-10-CM

## 2025-05-29 DIAGNOSIS — E03.9 ACQUIRED HYPOTHYROIDISM: ICD-10-CM

## 2025-05-29 DIAGNOSIS — K21.9 GASTROESOPHAGEAL REFLUX DISEASE WITHOUT ESOPHAGITIS: ICD-10-CM

## 2025-05-29 DIAGNOSIS — Z13.89 SCREENING FOR HEMATURIA OR PROTEINURIA: ICD-10-CM

## 2025-05-29 PROCEDURE — 1160F RVW MEDS BY RX/DR IN RCRD: CPT | Performed by: PHYSICIAN ASSISTANT

## 2025-05-29 PROCEDURE — 1170F FXNL STATUS ASSESSED: CPT | Performed by: PHYSICIAN ASSISTANT

## 2025-05-29 PROCEDURE — 99397 PER PM REEVAL EST PAT 65+ YR: CPT | Performed by: PHYSICIAN ASSISTANT

## 2025-05-29 PROCEDURE — 1126F AMNT PAIN NOTED NONE PRSNT: CPT | Performed by: PHYSICIAN ASSISTANT

## 2025-05-29 PROCEDURE — G0439 PPPS, SUBSEQ VISIT: HCPCS | Performed by: PHYSICIAN ASSISTANT

## 2025-05-29 PROCEDURE — 1159F MED LIST DOCD IN RCRD: CPT | Performed by: PHYSICIAN ASSISTANT

## 2025-05-29 PROCEDURE — 3074F SYST BP LT 130 MM HG: CPT | Performed by: PHYSICIAN ASSISTANT

## 2025-05-29 PROCEDURE — 1036F TOBACCO NON-USER: CPT | Performed by: PHYSICIAN ASSISTANT

## 2025-05-29 PROCEDURE — 3078F DIAST BP <80 MM HG: CPT | Performed by: PHYSICIAN ASSISTANT

## 2025-05-29 PROCEDURE — 99215 OFFICE O/P EST HI 40 MIN: CPT | Mod: 25 | Performed by: PHYSICIAN ASSISTANT

## 2025-05-29 RX ORDER — DEXLANSOPRAZOLE 30 MG/1
30 CAPSULE, DELAYED RELEASE ORAL DAILY
Qty: 14 CAPSULE | Refills: 0 | Status: SHIPPED | OUTPATIENT
Start: 2025-05-29 | End: 2025-06-12

## 2025-05-29 RX ORDER — FLUOXETINE 10 MG/1
10 CAPSULE ORAL DAILY
Qty: 90 CAPSULE | Refills: 3 | Status: SHIPPED | OUTPATIENT
Start: 2025-05-29 | End: 2026-05-24

## 2025-05-29 ASSESSMENT — COGNITIVE AND FUNCTIONAL STATUS - GENERAL
VERBAL FLUENCY - ANIMAL NAMES (0 TO 25): 3
TRAIL MAKING TEST: PATIENT COMPLETES TRAIL MAKING TEST PROPERLY.

## 2025-05-29 ASSESSMENT — COLUMBIA-SUICIDE SEVERITY RATING SCALE - C-SSRS
2. HAVE YOU ACTUALLY HAD ANY THOUGHTS OF KILLING YOURSELF?: NO
1. IN THE PAST MONTH, HAVE YOU WISHED YOU WERE DEAD OR WISHED YOU COULD GO TO SLEEP AND NOT WAKE UP?: NO
6. HAVE YOU EVER DONE ANYTHING, STARTED TO DO ANYTHING, OR PREPARED TO DO ANYTHING TO END YOUR LIFE?: NO

## 2025-05-29 ASSESSMENT — ACTIVITIES OF DAILY LIVING (ADL)
EATING: INDEPENDENT
DRESSING: INDEPENDENT
FEEDING: INDEPENDENT
GROCERY SHOPPING: INDEPENDENT
DOING HOUSEWORK: INDEPENDENT
PREPARING MEALS: INDEPENDENT
STIL DRIVING: YES
MANAGING FINANCES: INDEPENDENT
BATHING: INDEPENDENT
USING TRANSPORTATION: INDEPENDENT
ADEQUATE_TO_COMPLETE_ADL: YES
JUDGMENT_ADEQUATE_SAFELY_COMPLETE_DAILY_ACTIVITIES: YES
TAKING MEDICATION: INDEPENDENT
USING TELEPHONE: INDEPENDENT
NEEDS ASSISTANCE WITH FOOD: INDEPENDENT
TOILETING: INDEPENDENT
PILL BOX USED: NO

## 2025-05-29 ASSESSMENT — ENCOUNTER SYMPTOMS
LIGHT-HEADEDNESS: 0
BLOOD IN STOOL: 0
SHORTNESS OF BREATH: 0
ABDOMINAL PAIN: 0
HEADACHES: 0
DIZZINESS: 0

## 2025-05-29 ASSESSMENT — PATIENT HEALTH QUESTIONNAIRE - PHQ9
1. LITTLE INTEREST OR PLEASURE IN DOING THINGS: NOT AT ALL
2. FEELING DOWN, DEPRESSED OR HOPELESS: NOT AT ALL
SUM OF ALL RESPONSES TO PHQ9 QUESTIONS 1 AND 2: 0

## 2025-05-29 ASSESSMENT — PAIN SCALES - GENERAL: PAINLEVEL_OUTOF10: 0-NO PAIN

## 2025-05-29 NOTE — PROGRESS NOTES
Subjective   Patient ID: Marie Trammell is a 69 y.o. female who presents for Annual Exam (Pt is here for physical / nr).    HPI   Active Medical Problems: thalassemia minor, chronic fatigue, hypothyroidism, vitamin D deficiency, osteopenia, hx tobacco use - 2023, recurrent sinusitis, MARY, HTN, hx duodenal ulcer about 10 years ago tx w/dexilant, seasonal allergies  Other Providers: heme/onc, ophthalmology  PMH/PSH/FMH/SHX: Reviewed  Advanced Directives: Discussed  Hearing changes: Denies  Depression: Denies  Functional Ability/Safety: Get up and go test <30s. Denies issues with ADLs. No home safety concerns. Pt denies falls  Cognitive Eval: See MMSE  Preventative Services: CT Ca score ordered 5/24/24 but not completed. Due for mammogram, colonoscopy, CT lung screening. Received one dose of shingrix 10/21/21.    Fatigue - saw hematology, was told everything normal for someone w/thalassemia. Did test positive for hx of EBV + CMV. Under excessive stress w/mom in assisted living. Feeling stable at the moment    HTN - controlled on 25mg losartan    MARY - started on 10mg prozac 11/2024. Does feel better on medication, sleep has improved. Has a lot of life stressors and working on setting boundaries w/family members.    Seasonal allergies - not currently treating. Not interested in FU w/ENT at this time.    Hypothyroid - gets sensation of neck tightness occasionally. Feels like a spasm sensation and can feel heartbeat in her neck. Does have hx of duodenal ulcer - treated w/dexilant (failed other PPIs). Sxs feel somewhat similar but not as severe.    Review of Systems   HENT:  Negative for hearing loss.    Eyes:  Negative for visual disturbance.   Respiratory:  Negative for shortness of breath.    Cardiovascular:  Negative for chest pain.   Gastrointestinal:  Negative for abdominal pain and blood in stool.   Neurological:  Negative for dizziness, light-headedness and headaches.       Objective   /72   Pulse 69   Wt  71.3 kg (157 lb 3.2 oz)   SpO2 97%   BMI 26.35 kg/m²     Physical Exam  Vitals reviewed.   Constitutional:       Appearance: Normal appearance.   HENT:      Head: Normocephalic and atraumatic.      Right Ear: Tympanic membrane and ear canal normal.      Left Ear: Tympanic membrane and ear canal normal.      Nose: Nose normal.      Mouth/Throat:      Mouth: Mucous membranes are moist.      Pharynx: No oropharyngeal exudate.   Eyes:      Extraocular Movements: Extraocular movements intact.      Conjunctiva/sclera: Conjunctivae normal.      Pupils: Pupils are equal, round, and reactive to light.   Neck:      Thyroid: No thyroid mass, thyromegaly or thyroid tenderness.      Vascular: No carotid bruit.   Cardiovascular:      Rate and Rhythm: Normal rate and regular rhythm.      Heart sounds: Normal heart sounds.   Pulmonary:      Effort: Pulmonary effort is normal.      Breath sounds: Normal breath sounds. No wheezing.   Abdominal:      General: There is no distension.      Palpations: Abdomen is soft.      Tenderness: There is no abdominal tenderness.   Musculoskeletal:         General: No tenderness.      Cervical back: Normal range of motion and neck supple.   Lymphadenopathy:      Cervical: Cervical adenopathy (R anterior cervical mild LN enlargement, nontender) present.   Skin:     General: Skin is warm and dry.      Findings: No rash.   Neurological:      General: No focal deficit present.      Mental Status: She is alert. Mental status is at baseline.   Psychiatric:         Mood and Affect: Mood normal.         Assessment/Plan   Problem List Items Addressed This Visit           ICD-10-CM    Gastroesophageal reflux disease K21.9    Relevant Medications    dexlansoprazole (Dexilant) 30 mg DR capsule    Generalized anxiety disorder F41.1    Heterozygous thalassemia D56.3    Hypothyroidism E03.9    Relevant Orders    US thyroid    TSH with reflex to Free T4 if abnormal    Essential (primary) hypertension I10     Relevant Orders    Albumin-Creatinine Ratio, Urine Random    Dyslipidemia E78.5    Relevant Orders    Lipid Panel    Recurrent sinusitis J32.9     Other Visit Diagnoses         Codes      Routine medical exam    -  Primary Z00.00      Breast cancer screening by mammogram     Z12.31    Relevant Orders    BI mammo bilateral screening tomosynthesis      Neck pain     M54.2    Relevant Orders    US thyroid      Screening for hematuria or proteinuria     Z13.89    Relevant Orders    Urinalysis with Reflex Culture and Microscopic      Screening, ischemic heart disease     Z13.6    Relevant Orders    CT cardiac scoring wo IV contrast      Screen for colon cancer     Z12.11      History of duodenal ulcer     Z87.19    Relevant Medications    dexlansoprazole (Dexilant) 30 mg DR capsule      Personal history of nicotine dependence     Z87.891      MARY (generalized anxiety disorder)     F41.1    Relevant Medications    FLUoxetine (PROzac) 10 mg capsule          Declines CT lung screening

## 2025-05-30 ENCOUNTER — TELEPHONE (OUTPATIENT)
Dept: PRIMARY CARE | Facility: CLINIC | Age: 70
End: 2025-05-30
Payer: MEDICARE

## 2025-05-30 NOTE — TELEPHONE ENCOUNTER
Is there a way we can do a PA since she has tried and failed omeprazole + pantoprazole in the past?

## 2025-05-30 NOTE — TELEPHONE ENCOUNTER
Pharmacy sent fax stating that insurance does not cover dexlansoprazole 30mg Dr alves. Pharmacy did name alternatives, which are: pt last seen on 5/29/2025. Pharmacy is [facility].  ~ omeprazole  ~ pantoprazole sodium   ~ lansoprazole

## 2025-06-05 DIAGNOSIS — K21.9 GASTROESOPHAGEAL REFLUX DISEASE WITHOUT ESOPHAGITIS: ICD-10-CM

## 2025-06-05 DIAGNOSIS — Z87.19 HISTORY OF DUODENAL ULCER: ICD-10-CM

## 2025-06-05 RX ORDER — DEXLANSOPRAZOLE 30 MG/1
30 CAPSULE, DELAYED RELEASE ORAL DAILY
Qty: 14 CAPSULE | Refills: 0 | Status: CANCELLED | OUTPATIENT
Start: 2025-06-05 | End: 2025-06-19

## 2025-06-15 ENCOUNTER — TELEPHONE (OUTPATIENT)
Dept: PRIMARY CARE | Facility: CLINIC | Age: 70
End: 2025-06-15
Payer: MEDICARE

## 2025-10-30 ENCOUNTER — APPOINTMENT (OUTPATIENT)
Dept: OPHTHALMOLOGY | Facility: CLINIC | Age: 70
End: 2025-10-30
Payer: MEDICARE